# Patient Record
Sex: MALE | Race: WHITE | NOT HISPANIC OR LATINO | URBAN - METROPOLITAN AREA
[De-identification: names, ages, dates, MRNs, and addresses within clinical notes are randomized per-mention and may not be internally consistent; named-entity substitution may affect disease eponyms.]

---

## 2023-07-03 ENCOUNTER — HOSPITAL ENCOUNTER (OUTPATIENT)
Dept: DATA CONVERSION | Facility: HOSPITAL | Age: 73
End: 2023-07-03
Attending: RADIOLOGY | Admitting: RADIOLOGY

## 2023-07-03 DIAGNOSIS — I48.91 UNSPECIFIED ATRIAL FIBRILLATION (MULTI): ICD-10-CM

## 2023-07-03 DIAGNOSIS — Z79.82 LONG TERM (CURRENT) USE OF ASPIRIN: ICD-10-CM

## 2023-07-03 DIAGNOSIS — K80.00 CALCULUS OF GALLBLADDER WITH ACUTE CHOLECYSTITIS WITHOUT OBSTRUCTION: ICD-10-CM

## 2023-07-03 DIAGNOSIS — Z79.01 LONG TERM (CURRENT) USE OF ANTICOAGULANTS: ICD-10-CM

## 2023-09-29 VITALS — HEIGHT: 69 IN | BODY MASS INDEX: 38.01 KG/M2 | WEIGHT: 256.62 LBS

## 2023-10-10 ENCOUNTER — TELEPHONE (OUTPATIENT)
Dept: CARDIOLOGY | Facility: CLINIC | Age: 73
End: 2023-10-10
Payer: COMMERCIAL

## 2023-10-10 DIAGNOSIS — I50.9 CONGESTIVE HEART FAILURE, UNSPECIFIED HF CHRONICITY, UNSPECIFIED HEART FAILURE TYPE (MULTI): Primary | ICD-10-CM

## 2023-10-10 RX ORDER — EMPAGLIFLOZIN 10 MG/1
10 TABLET, FILM COATED ORAL DAILY
COMMUNITY
Start: 2023-08-23 | End: 2024-04-15 | Stop reason: SDUPTHER

## 2023-10-10 NOTE — TELEPHONE ENCOUNTER
Please send refill of Jardiance 10mg to [facility] ([facility]). Patient's daughter called to request refill. Best contact for her is [phone]. Thank you

## 2023-10-11 RX ORDER — EMPAGLIFLOZIN 10 MG/1
10 TABLET, FILM COATED ORAL DAILY
Qty: 90 TABLET | Refills: 1 | Status: CANCELLED | OUTPATIENT
Start: 2023-10-11

## 2023-10-25 PROBLEM — Z79.01 CHRONIC ANTICOAGULATION: Status: ACTIVE | Noted: 2023-10-25

## 2023-10-25 PROBLEM — I50.9 HEART FAILURE (MULTI): Status: ACTIVE | Noted: 2023-10-25

## 2023-10-25 PROBLEM — I48.91 ATRIAL FIBRILLATION (MULTI): Status: ACTIVE | Noted: 2023-10-25

## 2023-10-25 RX ORDER — RANOLAZINE 500 MG/1
500 TABLET, EXTENDED RELEASE ORAL DAILY
COMMUNITY
Start: 2023-07-27 | End: 2024-01-17 | Stop reason: HOSPADM

## 2023-10-25 RX ORDER — BLOOD-GLUCOSE CONTROL, NORMAL
EACH MISCELLANEOUS
COMMUNITY

## 2023-10-25 RX ORDER — METOPROLOL SUCCINATE 50 MG/1
50 TABLET, EXTENDED RELEASE ORAL DAILY
COMMUNITY
Start: 2023-07-26 | End: 2023-10-27 | Stop reason: WASHOUT

## 2023-10-25 RX ORDER — NITROGLYCERIN 0.4 MG/1
0.4 TABLET SUBLINGUAL EVERY 5 MIN PRN
COMMUNITY
Start: 2023-06-20

## 2023-10-25 RX ORDER — APIXABAN 5 MG/1
5 TABLET, FILM COATED ORAL 2 TIMES DAILY
Status: ON HOLD | COMMUNITY
Start: 2023-08-17 | End: 2024-01-17 | Stop reason: SDUPTHER

## 2023-10-25 RX ORDER — DIGOXIN 125 MCG
125 TABLET ORAL DAILY
COMMUNITY
Start: 2023-09-02 | End: 2024-01-19 | Stop reason: SDUPTHER

## 2023-10-25 RX ORDER — METOPROLOL SUCCINATE 100 MG/1
100 TABLET, EXTENDED RELEASE ORAL DAILY
Status: ON HOLD | COMMUNITY
Start: 2023-08-17 | End: 2024-01-16 | Stop reason: WASHOUT

## 2023-10-25 RX ORDER — SACUBITRIL AND VALSARTAN 24; 26 MG/1; MG/1
1 TABLET, FILM COATED ORAL 2 TIMES DAILY
COMMUNITY
Start: 2023-08-17 | End: 2024-03-25 | Stop reason: SINTOL

## 2023-10-25 RX ORDER — FUROSEMIDE 40 MG/1
40 TABLET ORAL DAILY
COMMUNITY
Start: 2023-05-09 | End: 2023-10-27 | Stop reason: WASHOUT

## 2023-10-25 RX ORDER — ASPIRIN 81 MG/1
81 TABLET ORAL DAILY
COMMUNITY

## 2023-10-25 RX ORDER — DOXYCYCLINE 100 MG/1
100 CAPSULE ORAL 2 TIMES DAILY
COMMUNITY
Start: 2023-08-17 | End: 2023-10-27 | Stop reason: WASHOUT

## 2023-10-25 RX ORDER — AMIODARONE HYDROCHLORIDE 200 MG/1
200 TABLET ORAL DAILY
COMMUNITY
Start: 2022-11-10 | End: 2023-10-27 | Stop reason: WASHOUT

## 2023-10-25 RX ORDER — AMOXICILLIN AND CLAVULANATE POTASSIUM 875; 125 MG/1; MG/1
1 TABLET, FILM COATED ORAL 2 TIMES DAILY
COMMUNITY
Start: 2023-06-20 | End: 2023-07-04

## 2023-10-25 RX ORDER — DOXYCYCLINE 100 MG/1
100 CAPSULE ORAL 2 TIMES DAILY
COMMUNITY
Start: 2023-06-20 | End: 2023-10-27 | Stop reason: WASHOUT

## 2023-10-25 RX ORDER — SODIUM CHLORIDE 0.9 % (FLUSH) 0.9 %
10 SYRINGE (ML) INJECTION DAILY
COMMUNITY
End: 2023-10-27 | Stop reason: WASHOUT

## 2023-10-25 RX ORDER — BLOOD-GLUCOSE METER
EACH MISCELLANEOUS 2 TIMES DAILY
COMMUNITY
Start: 2022-11-08

## 2023-10-25 RX ORDER — METFORMIN HYDROCHLORIDE 500 MG/1
500 TABLET ORAL DAILY
COMMUNITY
Start: 2023-09-04 | End: 2023-09-18 | Stop reason: WASHOUT

## 2023-10-26 ENCOUNTER — OFFICE VISIT (OUTPATIENT)
Dept: CARDIOLOGY | Facility: CLINIC | Age: 73
End: 2023-10-26
Payer: COMMERCIAL

## 2023-10-26 VITALS
WEIGHT: 240 LBS | HEIGHT: 69 IN | DIASTOLIC BLOOD PRESSURE: 78 MMHG | HEART RATE: 75 BPM | SYSTOLIC BLOOD PRESSURE: 110 MMHG | BODY MASS INDEX: 35.55 KG/M2

## 2023-10-26 DIAGNOSIS — I48.91 ATRIAL FIBRILLATION, UNSPECIFIED TYPE (MULTI): Primary | ICD-10-CM

## 2023-10-26 PROCEDURE — 99214 OFFICE O/P EST MOD 30 MIN: CPT | Performed by: INTERNAL MEDICINE

## 2023-10-26 PROCEDURE — 1159F MED LIST DOCD IN RCRD: CPT | Performed by: INTERNAL MEDICINE

## 2023-10-26 PROCEDURE — 1036F TOBACCO NON-USER: CPT | Performed by: INTERNAL MEDICINE

## 2023-10-26 PROCEDURE — 3008F BODY MASS INDEX DOCD: CPT | Performed by: INTERNAL MEDICINE

## 2023-10-26 PROCEDURE — 93000 ELECTROCARDIOGRAM COMPLETE: CPT | Performed by: INTERNAL MEDICINE

## 2023-10-26 NOTE — PROGRESS NOTES
Referred  for   Chief Complaint   Patient presents with    Follow-up        Jatin Rodgers is a 73 y.o. year old male patient with    1. HFrEF: prior reduced LV function of 16%, thought to be tachy mediated. EF normalized in May 2021. Most recent LV function 45-50%. Managed by general cardiology     2. Atrial fibrillation: Patient has history of AF with RVR in December 2020. At that time his LV function was 16% with LV thrombus. He was eventually cardioverted and started on amiodarone. Repeat echocardiogram in May 2021 showed LV function normalized. At some point, patient was found to be in AF and amiodarone was discontinued and he was changed to rate control strategy. At the end of May 2023, patient presented to [facility] with diaphoresis and chest discomfort. He was transferred to [facility] and was found to be in AF RVR with hypotension. He had left heart catheterization which showed nonobstructive CAD. Echocardiogram showed LV function 45 to 50%. During hospitalization patient was found to have cholecystitis and had drain placed. He was rate controlled with metoprolol and digoxin as it was unclear if he would need surgery.       Allergies:  No Known Allergies     Outpatient Medications:  Current Outpatient Medications   Medication Instructions    acetaminophen (Tylenol) 325 mg tablet TAKE 2 TABLETS BY MOUTH EVERY 6 HOURS    amiodarone (PACERONE) 200 mg, oral, Daily    aspirin 81 mg, oral, Daily    digoxin (LANOXIN) 125 mcg, oral, Daily    doxycycline (MONODOX) 100 mg, oral, 2 times daily    doxycycline (VIBRAMYCIN) 100 mg, oral, 2 times daily    Eliquis 5 mg, oral, 2 times daily    Entresto 24-26 mg tablet 1 tablet, oral, 2 times daily    furosemide (LASIX) 40 mg, oral, Daily    HYDROcodone-acetaminophen (Norco) 7.5-325 mg tablet TAKE 1 TABLET BY MOUTH EVERY 6 HOURS    Jardiance 10 mg, oral, Daily    lancets (OneTouch Delica Plus Lancet) 30 gauge misc miscellaneous    metoprolol succinate XL (TOPROL-XL) 100 mg, oral, Daily     "metoprolol succinate XL (TOPROL-XL) 50 mg, oral, Daily    nitroglycerin (NITROSTAT) 0.4 mg, sublingual, Every 5 min PRN    OneTouch Verio test strips strip 2 times daily    oxyCODONE (Roxicodone) 5 mg immediate release tablet TAKE 1 TABLET BY MOUTH EVERY 6 HOURS    ranolazine (RANEXA) 500 mg, oral, Daily    sodium chloride 0.9% (sodium chloride) flush 10 mL, intravenous, Daily         Last Recorded Vitals:      6/20/2023    10:43 AM 7/3/2023    10:37 AM 7/12/2023     9:30 AM 7/26/2023     3:24 PM 10/26/2023    10:54 AM   Vitals   Systolic 122  136 124 110   Diastolic 84  87 78 78   Heart Rate 72  74 93 75   Height (in) 1.753 m (5' 9\") 1.752 m (5' 8.98\") 1.753 m (5' 9\") 1.753 m (5' 9\") 1.753 m (5' 9\")   Weight (lb) 259.5 256.62 261.38 258 240   BMI 38.32 kg/m2 37.92 kg/m2 38.6 kg/m2 38.1 kg/m2 35.44 kg/m2   BSA (m2) 2.4 m2 2.38 m2 2.41 m2 2.39 m2 2.3 m2   Visit Report     Report    Visit Vitals  /78   Pulse 75   Ht 1.753 m (5' 9\")   Wt 109 kg (240 lb)   BMI 35.44 kg/m²   Smoking Status Never   BSA 2.3 m²        Physical Exam:  Physical Exam  Constitutional:       General: He is not in acute distress.     Appearance: Normal appearance.   Cardiovascular:      Rate and Rhythm: Normal rate and regular rhythm.   Musculoskeletal:         General: No swelling.   Skin:     General: Skin is warm and dry.   Neurological:      Mental Status: He is alert.             Lab Results   Component Value Date    WBC 14.3 (H) 08/01/2023    HGB 15.3 08/01/2023    HCT 47.6 08/01/2023     08/01/2023    ALT 11 08/01/2023    AST 14 08/01/2023     08/01/2023    K 4.2 08/01/2023     08/01/2023    CREATININE 0.71 08/01/2023    BUN 12 08/01/2023    CO2 24 08/01/2023    TSH 1.32 05/27/2023    INR 1.9 (H) 05/29/2023         Assessment    Patient seen today in office. He is s/p choleystectomy doing well. Continues in rate controlled AF on high dose metoprolol and digoxin. He would be better off in sinus rhythm especially " given his LV dysfunction. He has had failed cardioversions in the past. Discussed rhythm control with RFA vs AADs. We have opted to perform ablation.        PLAN    Schedule for atrial fibrillation ablation, CARTO, general anesthesia, hold elqiuis morning of procedure    Exclusive of any other services or procedures performed, I, Aaron Gómez MD , spent 30 minutes in duration for this visit today.  This time consisted of chart review, obtaining history, and/or performing the exam as documented above as well as documenting the clinical information for the encounter in the electronic record, discussing treatment options, plans, and/or goals with patient, family, and/or caregiver, refilling medications, updating the electronic record, ordering medicines, lab work, imaging, referrals, and/or procedures as documented above and communicating with other [facility] professionals. I have discussed the results of laboratory, radiology, and cardiology studies with the patient and their family/caregiver.

## 2023-10-30 DIAGNOSIS — I48.91 ATRIAL FIBRILLATION, UNSPECIFIED TYPE (MULTI): ICD-10-CM

## 2024-01-09 ENCOUNTER — TELEPHONE (OUTPATIENT)
Dept: CARDIOLOGY | Facility: CLINIC | Age: 74
End: 2024-01-09
Payer: COMMERCIAL

## 2024-01-09 NOTE — TELEPHONE ENCOUNTER
The patient is scheduled for Afib ablation with Dr. Sauer on 1/16 at [facility] with an arrival time of 10. Patient plans to update labs tomorrow . NPO after midnight the night before the procedure. Take morning medication with a sip of water except hold Eliquis the morning of the procedure.   The patient will stay overnight for observation. He will need a ride home. The patient's daughter verbalized understanding of instructions including appointment date, time, and location. All questions answered.

## 2024-01-12 ENCOUNTER — LAB (OUTPATIENT)
Dept: LAB | Facility: LAB | Age: 74
End: 2024-01-12
Payer: COMMERCIAL

## 2024-01-12 DIAGNOSIS — I48.91 ATRIAL FIBRILLATION, UNSPECIFIED TYPE (MULTI): ICD-10-CM

## 2024-01-12 LAB
ANION GAP SERPL CALC-SCNC: 11 MMOL/L (ref 10–20)
BUN SERPL-MCNC: 16 MG/DL (ref 6–23)
CALCIUM SERPL-MCNC: 9.2 MG/DL (ref 8.6–10.3)
CHLORIDE SERPL-SCNC: 103 MMOL/L (ref 98–107)
CO2 SERPL-SCNC: 30 MMOL/L (ref 21–32)
CREAT SERPL-MCNC: 0.92 MG/DL (ref 0.5–1.3)
EGFRCR SERPLBLD CKD-EPI 2021: 88 ML/MIN/1.73M*2
ERYTHROCYTE [DISTWIDTH] IN BLOOD BY AUTOMATED COUNT: 14.6 % (ref 11.5–14.5)
GLUCOSE SERPL-MCNC: 83 MG/DL (ref 74–99)
HCT VFR BLD AUTO: 56.1 % (ref 41–52)
HGB BLD-MCNC: 17.8 G/DL (ref 13.5–17.5)
INR PPP: 1.3 (ref 0.9–1.1)
MCH RBC QN AUTO: 28.9 PG (ref 26–34)
MCHC RBC AUTO-ENTMCNC: 31.7 G/DL (ref 32–36)
MCV RBC AUTO: 91 FL (ref 80–100)
NRBC BLD-RTO: 0 /100 WBCS (ref 0–0)
PLATELET # BLD AUTO: 255 X10*3/UL (ref 150–450)
POTASSIUM SERPL-SCNC: 4.3 MMOL/L (ref 3.5–5.3)
PROTHROMBIN TIME: 14.6 SECONDS (ref 9.8–12.8)
RBC # BLD AUTO: 6.15 X10*6/UL (ref 4.5–5.9)
SODIUM SERPL-SCNC: 140 MMOL/L (ref 136–145)
WBC # BLD AUTO: 8.6 X10*3/UL (ref 4.4–11.3)

## 2024-01-12 PROCEDURE — 85610 PROTHROMBIN TIME: CPT

## 2024-01-12 PROCEDURE — 80048 BASIC METABOLIC PNL TOTAL CA: CPT

## 2024-01-12 PROCEDURE — 36415 COLL VENOUS BLD VENIPUNCTURE: CPT

## 2024-01-12 PROCEDURE — 85027 COMPLETE CBC AUTOMATED: CPT

## 2024-01-16 ENCOUNTER — HOSPITAL ENCOUNTER (OUTPATIENT)
Facility: HOSPITAL | Age: 74
LOS: 1 days | Discharge: HOME | End: 2024-01-17
Attending: INTERNAL MEDICINE | Admitting: INTERNAL MEDICINE
Payer: COMMERCIAL

## 2024-01-16 ENCOUNTER — ANESTHESIA (OUTPATIENT)
Dept: CARDIOLOGY | Facility: HOSPITAL | Age: 74
End: 2024-01-16
Payer: COMMERCIAL

## 2024-01-16 ENCOUNTER — ANESTHESIA EVENT (OUTPATIENT)
Dept: CARDIOLOGY | Facility: HOSPITAL | Age: 74
End: 2024-01-16
Payer: COMMERCIAL

## 2024-01-16 DIAGNOSIS — I48.91 ATRIAL FIBRILLATION, UNSPECIFIED TYPE (MULTI): Primary | ICD-10-CM

## 2024-01-16 PROBLEM — Z98.890 STATUS POST ABLATION OF ATRIAL FIBRILLATION: Status: ACTIVE | Noted: 2024-01-16

## 2024-01-16 PROBLEM — Z86.79 STATUS POST ABLATION OF ATRIAL FIBRILLATION: Status: ACTIVE | Noted: 2024-01-16

## 2024-01-16 PROBLEM — D75.1 POLYCYTHEMIA: Status: ACTIVE | Noted: 2024-01-16

## 2024-01-16 PROBLEM — E11.9 DIABETES MELLITUS, TYPE 2 (MULTI): Status: ACTIVE | Noted: 2024-01-16

## 2024-01-16 PROCEDURE — C1730 CATH, EP, 19 OR FEW ELECT: HCPCS | Performed by: INTERNAL MEDICINE

## 2024-01-16 PROCEDURE — C1731 CATH, EP, 20 OR MORE ELEC: HCPCS | Performed by: INTERNAL MEDICINE

## 2024-01-16 PROCEDURE — 2500000005 HC RX 250 GENERAL PHARMACY W/O HCPCS: Performed by: ANESTHESIOLOGIST ASSISTANT

## 2024-01-16 PROCEDURE — 99222 1ST HOSP IP/OBS MODERATE 55: CPT

## 2024-01-16 PROCEDURE — C1759 CATH, INTRA ECHOCARDIOGRAPHY: HCPCS | Performed by: INTERNAL MEDICINE

## 2024-01-16 PROCEDURE — 76937 US GUIDE VASCULAR ACCESS: CPT | Performed by: INTERNAL MEDICINE

## 2024-01-16 PROCEDURE — 7100000002 HC RECOVERY ROOM TIME - EACH INCREMENTAL 1 MINUTE: Performed by: INTERNAL MEDICINE

## 2024-01-16 PROCEDURE — 93662 INTRACARDIAC ECG (ICE): CPT | Performed by: INTERNAL MEDICINE

## 2024-01-16 PROCEDURE — 2500000004 HC RX 250 GENERAL PHARMACY W/ HCPCS (ALT 636 FOR OP/ED): Performed by: NURSE PRACTITIONER

## 2024-01-16 PROCEDURE — 2780000003 HC OR 278 NO HCPCS: Performed by: INTERNAL MEDICINE

## 2024-01-16 PROCEDURE — 94760 N-INVAS EAR/PLS OXIMETRY 1: CPT

## 2024-01-16 PROCEDURE — RXMED WILLOW AMBULATORY MEDICATION CHARGE

## 2024-01-16 PROCEDURE — 99100 ANES PT EXTEME AGE<1 YR&>70: CPT | Performed by: ANESTHESIOLOGY

## 2024-01-16 PROCEDURE — 2500000004 HC RX 250 GENERAL PHARMACY W/ HCPCS (ALT 636 FOR OP/ED): Performed by: ANESTHESIOLOGIST ASSISTANT

## 2024-01-16 PROCEDURE — 3700000001 HC GENERAL ANESTHESIA TIME - INITIAL BASE CHARGE: Performed by: INTERNAL MEDICINE

## 2024-01-16 PROCEDURE — 85347 COAGULATION TIME ACTIVATED: CPT | Mod: 91

## 2024-01-16 PROCEDURE — A93656 PR EPHYS EVL TRNSPTL TX ATRIAL FIB ISOLAT PULM VEIN: Performed by: ANESTHESIOLOGY

## 2024-01-16 PROCEDURE — 93656 COMPRE EP EVAL ABLTJ ATR FIB: CPT | Performed by: INTERNAL MEDICINE

## 2024-01-16 PROCEDURE — 7100000001 HC RECOVERY ROOM TIME - INITIAL BASE CHARGE: Performed by: INTERNAL MEDICINE

## 2024-01-16 PROCEDURE — C1732 CATH, EP, DIAG/ABL, 3D/VECT: HCPCS | Performed by: INTERNAL MEDICINE

## 2024-01-16 PROCEDURE — 2720000007 HC OR 272 NO HCPCS: Performed by: INTERNAL MEDICINE

## 2024-01-16 PROCEDURE — 93621 COMP EP EVL L PAC&REC C SINS: CPT | Performed by: INTERNAL MEDICINE

## 2024-01-16 PROCEDURE — 2500000001 HC RX 250 WO HCPCS SELF ADMINISTERED DRUGS (ALT 637 FOR MEDICARE OP): Performed by: ANESTHESIOLOGIST ASSISTANT

## 2024-01-16 PROCEDURE — 7100000011 HC EXTENDED STAY RECOVERY HOURLY - NURSING UNIT

## 2024-01-16 PROCEDURE — C1760 CLOSURE DEV, VASC: HCPCS | Performed by: INTERNAL MEDICINE

## 2024-01-16 PROCEDURE — 2500000001 HC RX 250 WO HCPCS SELF ADMINISTERED DRUGS (ALT 637 FOR MEDICARE OP)

## 2024-01-16 PROCEDURE — 85347 COAGULATION TIME ACTIVATED: CPT

## 2024-01-16 PROCEDURE — A93656 PR EPHYS EVL TRNSPTL TX ATRIAL FIB ISOLAT PULM VEIN: Performed by: ANESTHESIOLOGIST ASSISTANT

## 2024-01-16 PROCEDURE — 92960 CARDIOVERSION ELECTRIC EXT: CPT | Mod: 59 | Performed by: INTERNAL MEDICINE

## 2024-01-16 PROCEDURE — C1766 INTRO/SHEATH,STRBLE,NON-PEEL: HCPCS | Performed by: INTERNAL MEDICINE

## 2024-01-16 PROCEDURE — 3700000002 HC GENERAL ANESTHESIA TIME - EACH INCREMENTAL 1 MINUTE: Performed by: INTERNAL MEDICINE

## 2024-01-16 RX ORDER — RANOLAZINE 500 MG/1
500 TABLET, EXTENDED RELEASE ORAL 2 TIMES DAILY
Status: DISCONTINUED | OUTPATIENT
Start: 2024-01-16 | End: 2024-01-17 | Stop reason: HOSPADM

## 2024-01-16 RX ORDER — FUROSEMIDE 40 MG/1
40 TABLET ORAL DAILY
Status: DISCONTINUED | OUTPATIENT
Start: 2024-01-17 | End: 2024-01-17 | Stop reason: HOSPADM

## 2024-01-16 RX ORDER — PANTOPRAZOLE SODIUM 40 MG/10ML
40 INJECTION, POWDER, LYOPHILIZED, FOR SOLUTION INTRAVENOUS
Status: DISCONTINUED | OUTPATIENT
Start: 2024-01-16 | End: 2024-01-16

## 2024-01-16 RX ORDER — PHENYLEPHRINE HCL IN 0.9% NACL 0.4MG/10ML
SYRINGE (ML) INTRAVENOUS AS NEEDED
Status: DISCONTINUED | OUTPATIENT
Start: 2024-01-16 | End: 2024-01-16

## 2024-01-16 RX ORDER — LIDOCAINE HYDROCHLORIDE 20 MG/ML
INJECTION, SOLUTION EPIDURAL; INFILTRATION; INTRACAUDAL; PERINEURAL AS NEEDED
Status: DISCONTINUED | OUTPATIENT
Start: 2024-01-16 | End: 2024-01-16

## 2024-01-16 RX ORDER — LIDOCAINE HYDROCHLORIDE 40 MG/ML
SOLUTION TOPICAL AS NEEDED
Status: DISCONTINUED | OUTPATIENT
Start: 2024-01-16 | End: 2024-01-16

## 2024-01-16 RX ORDER — MIDAZOLAM HYDROCHLORIDE 1 MG/ML
INJECTION INTRAMUSCULAR; INTRAVENOUS AS NEEDED
Status: DISCONTINUED | OUTPATIENT
Start: 2024-01-16 | End: 2024-01-16

## 2024-01-16 RX ORDER — HEPARIN SODIUM 10000 [USP'U]/100ML
INJECTION, SOLUTION INTRAVENOUS CONTINUOUS PRN
Status: DISCONTINUED | OUTPATIENT
Start: 2024-01-16 | End: 2024-01-16

## 2024-01-16 RX ORDER — HEPARIN SODIUM 1000 [USP'U]/ML
INJECTION, SOLUTION INTRAVENOUS; SUBCUTANEOUS AS NEEDED
Status: DISCONTINUED | OUTPATIENT
Start: 2024-01-16 | End: 2024-01-16

## 2024-01-16 RX ORDER — SODIUM CHLORIDE 9 MG/ML
75 INJECTION, SOLUTION INTRAVENOUS CONTINUOUS
Status: DISCONTINUED | OUTPATIENT
Start: 2024-01-16 | End: 2024-01-16

## 2024-01-16 RX ORDER — RANOLAZINE 500 MG/1
500 TABLET, EXTENDED RELEASE ORAL 2 TIMES DAILY
COMMUNITY
Start: 2023-06-21 | End: 2024-03-25 | Stop reason: ALTCHOICE

## 2024-01-16 RX ORDER — ASPIRIN 81 MG/1
81 TABLET ORAL DAILY
Status: DISCONTINUED | OUTPATIENT
Start: 2024-01-17 | End: 2024-01-17 | Stop reason: HOSPADM

## 2024-01-16 RX ORDER — PANTOPRAZOLE SODIUM 40 MG/1
40 TABLET, DELAYED RELEASE ORAL DAILY
Qty: 30 TABLET | Refills: 0 | Status: SHIPPED | OUTPATIENT
Start: 2024-01-16 | End: 2024-02-16

## 2024-01-16 RX ORDER — METFORMIN HYDROCHLORIDE 500 MG/1
500 TABLET ORAL 2 TIMES DAILY
COMMUNITY
Start: 2023-12-14

## 2024-01-16 RX ORDER — GLYCOPYRROLATE 0.2 MG/ML
INJECTION INTRAMUSCULAR; INTRAVENOUS AS NEEDED
Status: DISCONTINUED | OUTPATIENT
Start: 2024-01-16 | End: 2024-01-16

## 2024-01-16 RX ORDER — ACETAMINOPHEN 325 MG/1
650 TABLET ORAL EVERY 4 HOURS PRN
Status: DISCONTINUED | OUTPATIENT
Start: 2024-01-16 | End: 2024-01-17 | Stop reason: HOSPADM

## 2024-01-16 RX ORDER — ONDANSETRON HYDROCHLORIDE 2 MG/ML
4 INJECTION, SOLUTION INTRAVENOUS ONCE AS NEEDED
Status: DISCONTINUED | OUTPATIENT
Start: 2024-01-16 | End: 2024-01-17 | Stop reason: HOSPADM

## 2024-01-16 RX ORDER — PANTOPRAZOLE SODIUM 40 MG/1
40 TABLET, DELAYED RELEASE ORAL
Status: DISCONTINUED | OUTPATIENT
Start: 2024-01-16 | End: 2024-01-17 | Stop reason: HOSPADM

## 2024-01-16 RX ORDER — OXYCODONE HYDROCHLORIDE 5 MG/1
5 TABLET ORAL EVERY 4 HOURS PRN
Status: DISCONTINUED | OUTPATIENT
Start: 2024-01-16 | End: 2024-01-17 | Stop reason: HOSPADM

## 2024-01-16 RX ORDER — FUROSEMIDE 40 MG/1
40 TABLET ORAL
COMMUNITY
Start: 2023-10-27 | End: 2024-03-25 | Stop reason: SDUPTHER

## 2024-01-16 RX ORDER — PROTAMINE SULFATE 10 MG/ML
INJECTION, SOLUTION INTRAVENOUS AS NEEDED
Status: DISCONTINUED | OUTPATIENT
Start: 2024-01-16 | End: 2024-01-16

## 2024-01-16 RX ORDER — ALBUTEROL SULFATE 0.83 MG/ML
2.5 SOLUTION RESPIRATORY (INHALATION) ONCE AS NEEDED
Status: DISCONTINUED | OUTPATIENT
Start: 2024-01-16 | End: 2024-01-17

## 2024-01-16 RX ORDER — ROCURONIUM BROMIDE 10 MG/ML
INJECTION, SOLUTION INTRAVENOUS AS NEEDED
Status: DISCONTINUED | OUTPATIENT
Start: 2024-01-16 | End: 2024-01-16

## 2024-01-16 RX ORDER — RANOLAZINE 500 MG/1
500 TABLET, EXTENDED RELEASE ORAL 2 TIMES DAILY
Status: DISCONTINUED | OUTPATIENT
Start: 2024-01-16 | End: 2024-01-16

## 2024-01-16 RX ORDER — SODIUM CHLORIDE 9 MG/ML
INJECTION, SOLUTION INTRAVENOUS CONTINUOUS PRN
Status: DISCONTINUED | OUTPATIENT
Start: 2024-01-16 | End: 2024-01-16

## 2024-01-16 RX ORDER — ASPIRIN 81 MG/1
81 TABLET ORAL DAILY
Status: DISCONTINUED | OUTPATIENT
Start: 2024-01-16 | End: 2024-01-16

## 2024-01-16 RX ORDER — PHENYLEPHRINE 10 MG/250 ML(40 MCG/ML)IN 0.9 % SOD.CHLORIDE INTRAVENOUS
CONTINUOUS PRN
Status: DISCONTINUED | OUTPATIENT
Start: 2024-01-16 | End: 2024-01-16

## 2024-01-16 RX ORDER — ONDANSETRON HYDROCHLORIDE 2 MG/ML
INJECTION, SOLUTION INTRAVENOUS AS NEEDED
Status: DISCONTINUED | OUTPATIENT
Start: 2024-01-16 | End: 2024-01-16

## 2024-01-16 RX ORDER — ACETAMINOPHEN 325 MG/1
650 TABLET ORAL EVERY 6 HOURS
Status: DISCONTINUED | OUTPATIENT
Start: 2024-01-16 | End: 2024-01-17 | Stop reason: HOSPADM

## 2024-01-16 RX ORDER — IPRATROPIUM BROMIDE 0.5 MG/2.5ML
500 SOLUTION RESPIRATORY (INHALATION) ONCE
Status: DISCONTINUED | OUTPATIENT
Start: 2024-01-16 | End: 2024-01-17

## 2024-01-16 RX ORDER — LIDOCAINE HYDROCHLORIDE 10 MG/ML
0.1 INJECTION INFILTRATION; PERINEURAL ONCE
Status: DISCONTINUED | OUTPATIENT
Start: 2024-01-16 | End: 2024-01-17 | Stop reason: HOSPADM

## 2024-01-16 RX ORDER — NITROGLYCERIN 0.4 MG/1
0.4 TABLET SUBLINGUAL EVERY 5 MIN PRN
COMMUNITY
Start: 2023-06-21

## 2024-01-16 RX ORDER — SODIUM CHLORIDE, SODIUM LACTATE, POTASSIUM CHLORIDE, CALCIUM CHLORIDE 600; 310; 30; 20 MG/100ML; MG/100ML; MG/100ML; MG/100ML
100 INJECTION, SOLUTION INTRAVENOUS CONTINUOUS
Status: DISCONTINUED | OUTPATIENT
Start: 2024-01-16 | End: 2024-01-16

## 2024-01-16 RX ORDER — METOPROLOL SUCCINATE 50 MG/1
50 TABLET, EXTENDED RELEASE ORAL DAILY
COMMUNITY
Start: 2023-10-28 | End: 2024-03-25 | Stop reason: ALTCHOICE

## 2024-01-16 RX ORDER — PROPOFOL 10 MG/ML
INJECTION, EMULSION INTRAVENOUS AS NEEDED
Status: DISCONTINUED | OUTPATIENT
Start: 2024-01-16 | End: 2024-01-16

## 2024-01-16 RX ORDER — METOPROLOL SUCCINATE 50 MG/1
50 TABLET, EXTENDED RELEASE ORAL DAILY
Status: DISCONTINUED | OUTPATIENT
Start: 2024-01-16 | End: 2024-01-17 | Stop reason: HOSPADM

## 2024-01-16 RX ORDER — DIGOXIN 125 MCG
125 TABLET ORAL DAILY
Status: DISCONTINUED | OUTPATIENT
Start: 2024-01-16 | End: 2024-01-17 | Stop reason: HOSPADM

## 2024-01-16 RX ORDER — FENTANYL CITRATE 50 UG/ML
INJECTION, SOLUTION INTRAMUSCULAR; INTRAVENOUS AS NEEDED
Status: DISCONTINUED | OUTPATIENT
Start: 2024-01-16 | End: 2024-01-16

## 2024-01-16 RX ORDER — FUROSEMIDE 40 MG/1
40 TABLET ORAL DAILY
Status: DISCONTINUED | OUTPATIENT
Start: 2024-01-17 | End: 2024-01-16

## 2024-01-16 RX ADMIN — Medication 200 MCG: at 12:12

## 2024-01-16 RX ADMIN — SODIUM CHLORIDE: 9 INJECTION, SOLUTION INTRAVENOUS at 11:58

## 2024-01-16 RX ADMIN — HEPARIN SODIUM 3000 UNITS: 1000 INJECTION INTRAVENOUS; SUBCUTANEOUS at 13:23

## 2024-01-16 RX ADMIN — Medication 200 MCG: at 12:08

## 2024-01-16 RX ADMIN — APIXABAN 5 MG: 5 TABLET, FILM COATED ORAL at 20:46

## 2024-01-16 RX ADMIN — Medication 200 MCG: at 12:29

## 2024-01-16 RX ADMIN — GLYCOPYRROLATE 0.1 MG: 0.2 INJECTION, SOLUTION INTRAMUSCULAR; INTRAVENOUS at 11:32

## 2024-01-16 RX ADMIN — MIDAZOLAM HYDROCHLORIDE 1 MG: 1 INJECTION INTRAMUSCULAR; INTRAVENOUS at 11:32

## 2024-01-16 RX ADMIN — Medication 80 MCG: at 13:57

## 2024-01-16 RX ADMIN — SUGAMMADEX 400 MG: 100 INJECTION, SOLUTION INTRAVENOUS at 13:45

## 2024-01-16 RX ADMIN — LIDOCAINE HYDROCHLORIDE 100 MG: 20 INJECTION, SOLUTION EPIDURAL; INFILTRATION; INTRACAUDAL; PERINEURAL at 11:44

## 2024-01-16 RX ADMIN — Medication 200 MCG: at 12:18

## 2024-01-16 RX ADMIN — ONDANSETRON 4 MG: 2 INJECTION INTRAMUSCULAR; INTRAVENOUS at 13:46

## 2024-01-16 RX ADMIN — MIDAZOLAM HYDROCHLORIDE 1 MG: 1 INJECTION INTRAMUSCULAR; INTRAVENOUS at 11:29

## 2024-01-16 RX ADMIN — Medication 200 MCG: at 12:02

## 2024-01-16 RX ADMIN — Medication 200 MCG: at 12:06

## 2024-01-16 RX ADMIN — PROPOFOL 140 MG: 10 INJECTION, EMULSION INTRAVENOUS at 11:44

## 2024-01-16 RX ADMIN — SODIUM CHLORIDE 75 ML/HR: 9 INJECTION, SOLUTION INTRAVENOUS at 10:32

## 2024-01-16 RX ADMIN — Medication 200 MCG: at 13:43

## 2024-01-16 RX ADMIN — Medication 0.4 MCG/KG/MIN: at 12:08

## 2024-01-16 RX ADMIN — PROTAMINE SULFATE 30 MG: 10 INJECTION, SOLUTION INTRAVENOUS at 13:40

## 2024-01-16 RX ADMIN — Medication 120 MCG: at 13:06

## 2024-01-16 RX ADMIN — ACETAMINOPHEN 650 MG: 325 TABLET ORAL at 20:46

## 2024-01-16 RX ADMIN — RANOLAZINE 500 MG: 500 TABLET, EXTENDED RELEASE ORAL at 20:46

## 2024-01-16 RX ADMIN — HEPARIN SODIUM 11.01 UNITS/KG/HR: 10000 INJECTION, SOLUTION INTRAVENOUS at 12:25

## 2024-01-16 RX ADMIN — HEPARIN SODIUM 13000 UNITS: 1000 INJECTION INTRAVENOUS; SUBCUTANEOUS at 12:25

## 2024-01-16 RX ADMIN — FENTANYL CITRATE 25 MCG: 50 INJECTION, SOLUTION INTRAMUSCULAR; INTRAVENOUS at 13:42

## 2024-01-16 RX ADMIN — Medication 80 MCG: at 12:58

## 2024-01-16 RX ADMIN — ROCURONIUM BROMIDE 80 MG: 10 INJECTION, SOLUTION INTRAVENOUS at 11:44

## 2024-01-16 RX ADMIN — LIDOCAINE HYDROCHLORIDE 4 ML: 40 SOLUTION TOPICAL at 11:44

## 2024-01-16 RX ADMIN — ROCURONIUM BROMIDE 20 MG: 10 INJECTION, SOLUTION INTRAVENOUS at 13:02

## 2024-01-16 RX ADMIN — HEPARIN SODIUM 3000 UNITS: 1000 INJECTION INTRAVENOUS; SUBCUTANEOUS at 12:37

## 2024-01-16 RX ADMIN — FENTANYL CITRATE 25 MCG: 50 INJECTION, SOLUTION INTRAMUSCULAR; INTRAVENOUS at 13:45

## 2024-01-16 SDOH — SOCIAL STABILITY: SOCIAL INSECURITY: ABUSE: ADULT

## 2024-01-16 SDOH — SOCIAL STABILITY: SOCIAL INSECURITY: HAVE YOU HAD THOUGHTS OF HARMING ANYONE ELSE?: NO

## 2024-01-16 SDOH — SOCIAL STABILITY: SOCIAL INSECURITY: DO YOU FEEL UNSAFE GOING BACK TO THE PLACE WHERE YOU ARE LIVING?: NO

## 2024-01-16 SDOH — SOCIAL STABILITY: SOCIAL INSECURITY: WERE YOU ABLE TO COMPLETE ALL THE BEHAVIORAL HEALTH SCREENINGS?: YES

## 2024-01-16 SDOH — SOCIAL STABILITY: SOCIAL INSECURITY: DO YOU FEEL ANYONE HAS EXPLOITED OR TAKEN ADVANTAGE OF YOU FINANCIALLY OR OF YOUR PERSONAL PROPERTY?: NO

## 2024-01-16 SDOH — SOCIAL STABILITY: SOCIAL INSECURITY: ARE YOU OR HAVE YOU BEEN THREATENED OR ABUSED PHYSICALLY, EMOTIONALLY, OR SEXUALLY BY ANYONE?: NO

## 2024-01-16 SDOH — SOCIAL STABILITY: SOCIAL INSECURITY: ARE THERE ANY APPARENT SIGNS OF INJURIES/BEHAVIORS THAT COULD BE RELATED TO ABUSE/NEGLECT?: NO

## 2024-01-16 SDOH — SOCIAL STABILITY: SOCIAL INSECURITY: DOES ANYONE TRY TO KEEP YOU FROM HAVING/CONTACTING OTHER FRIENDS OR DOING THINGS OUTSIDE YOUR HOME?: NO

## 2024-01-16 SDOH — SOCIAL STABILITY: SOCIAL INSECURITY: HAS ANYONE EVER THREATENED TO HURT YOUR FAMILY OR YOUR PETS?: NO

## 2024-01-16 ASSESSMENT — COGNITIVE AND FUNCTIONAL STATUS - GENERAL
TURNING FROM BACK TO SIDE WHILE IN FLAT BAD: A LITTLE
PERSONAL GROOMING: A LITTLE
HELP NEEDED FOR BATHING: A LITTLE
DRESSING REGULAR UPPER BODY CLOTHING: A LITTLE
DRESSING REGULAR LOWER BODY CLOTHING: A LITTLE
WALKING IN HOSPITAL ROOM: A LITTLE
STANDING UP FROM CHAIR USING ARMS: A LITTLE
CLIMB 3 TO 5 STEPS WITH RAILING: A LITTLE
MOVING TO AND FROM BED TO CHAIR: A LITTLE
TOILETING: A LITTLE
PATIENT BASELINE BEDBOUND: UNABLE TO ASSESS AT THIS TIME
DAILY ACTIVITIY SCORE: 19

## 2024-01-16 ASSESSMENT — PAIN - FUNCTIONAL ASSESSMENT
PAIN_FUNCTIONAL_ASSESSMENT: 0-10

## 2024-01-16 ASSESSMENT — ACTIVITIES OF DAILY LIVING (ADL)
BATHING: NEEDS ASSISTANCE
HEARING - LEFT EAR: FUNCTIONAL
ADEQUATE_TO_COMPLETE_ADL: YES
JUDGMENT_ADEQUATE_SAFELY_COMPLETE_DAILY_ACTIVITIES: YES
LACK_OF_TRANSPORTATION: NO
FEEDING YOURSELF: NEEDS ASSISTANCE
TOILETING: NEEDS ASSISTANCE
HEARING - RIGHT EAR: FUNCTIONAL
DRESSING YOURSELF: NEEDS ASSISTANCE
PATIENT'S MEMORY ADEQUATE TO SAFELY COMPLETE DAILY ACTIVITIES?: YES
GROOMING: NEEDS ASSISTANCE
WALKS IN HOME: NEEDS ASSISTANCE

## 2024-01-16 ASSESSMENT — COLUMBIA-SUICIDE SEVERITY RATING SCALE - C-SSRS
2. HAVE YOU ACTUALLY HAD ANY THOUGHTS OF KILLING YOURSELF?: NO
6. HAVE YOU EVER DONE ANYTHING, STARTED TO DO ANYTHING, OR PREPARED TO DO ANYTHING TO END YOUR LIFE?: NO
1. IN THE PAST MONTH, HAVE YOU WISHED YOU WERE DEAD OR WISHED YOU COULD GO TO SLEEP AND NOT WAKE UP?: NO

## 2024-01-16 ASSESSMENT — LIFESTYLE VARIABLES
AUDIT-C TOTAL SCORE: 0
HOW OFTEN DO YOU HAVE A DRINK CONTAINING ALCOHOL: NEVER
SKIP TO QUESTIONS 9-10: 1
HOW MANY STANDARD DRINKS CONTAINING ALCOHOL DO YOU HAVE ON A TYPICAL DAY: PATIENT DOES NOT DRINK
HOW OFTEN DO YOU HAVE 6 OR MORE DRINKS ON ONE OCCASION: NEVER
AUDIT-C TOTAL SCORE: 0

## 2024-01-16 ASSESSMENT — PAIN SCALES - GENERAL

## 2024-01-16 ASSESSMENT — ENCOUNTER SYMPTOMS
RESPIRATORY NEGATIVE: 1
ALLERGIC/IMMUNOLOGIC NEGATIVE: 1
GASTROINTESTINAL NEGATIVE: 1
CONSTITUTIONAL NEGATIVE: 1
ENDOCRINE NEGATIVE: 1
HEMATOLOGIC/LYMPHATIC NEGATIVE: 1
MUSCULOSKELETAL NEGATIVE: 1
NEUROLOGICAL NEGATIVE: 1
EYES NEGATIVE: 1
PSYCHIATRIC NEGATIVE: 1

## 2024-01-16 ASSESSMENT — PATIENT HEALTH QUESTIONNAIRE - PHQ9
1. LITTLE INTEREST OR PLEASURE IN DOING THINGS: NOT AT ALL
2. FEELING DOWN, DEPRESSED OR HOPELESS: NOT AT ALL
SUM OF ALL RESPONSES TO PHQ9 QUESTIONS 1 & 2: 0

## 2024-01-16 NOTE — ANESTHESIA PREPROCEDURE EVALUATION
Patient: Jatin Rodgers    Procedure Information       Date/Time: 01/16/24 1200    Procedure: Ablation A-Fib - atrial fibrillation ablation, CARTO, general anesthesia, hold elqiuis morning of procedure  CPT 22189    Location: [facility] LAB 3 / Virtual [facility] Cardiac Cath Lab    Providers: Aaron Gómez MD            Relevant Problems   Cardiovascular   (+) Atrial fibrillation (CMS/HCC)   (+) Heart failure (CMS/HCC)      Endocrine   (+) Diabetes mellitus, type 2 (CMS/HCC)      Hematology   (+) Chronic anticoagulation   (+) Polycythemia       Clinical information reviewed:   Tobacco  Allergies  Meds   Med Hx  Surg Hx   Fam Hx  Soc Hx        NPO Detail:  No data recorded     Physical Exam    Airway  Mallampati: II     Cardiovascular   Rhythm: regular  Rate: normal     Dental    Pulmonary   Breath sounds clear to auscultation     Abdominal            Anesthesia Plan    History of general anesthesia?: yes  History of complications of general anesthesia?: no    ASA 3     general     intravenous induction   Anesthetic plan and risks discussed with patient.    Plan discussed with CAA.

## 2024-01-16 NOTE — Clinical Note
Patient Clipped and Prepped: brachials. Prepped with ChloraPrep, a minimum of 3 minute dry time, longer if needed, no pooling noted, patient draped in sterile fashion.

## 2024-01-16 NOTE — ANESTHESIA POSTPROCEDURE EVALUATION
Patient: Jatin Rodgers    Procedure Summary       Date: 01/16/24 Room / Location: [facility] / [facility]    Anesthesia Start: 1126 Anesthesia Stop: 1419    Procedure: Ablation A-Fib Diagnosis:       Atrial fibrillation, unspecified type (CMS/HCC)      (Atrial fibrillation, unspecified type (CMS/HCC) [I48.91])    Providers: Aaron Gómez MD Responsible Provider: Michael Moreno MD    Anesthesia Type: general ASA Status: 3            Anesthesia Type: general    Vitals Value Taken Time   BP  01/16/24 1432   Temp  01/16/24 1432   Pulse  01/16/24 1432   Resp  01/16/24 1432   SpO2  01/16/24 1432       Anesthesia Post Evaluation    Patient location during evaluation: bedside  Patient participation: waiting for patient participation  Level of consciousness: sedated  Pain management: adequate  Airway patency: patent  Cardiovascular status: acceptable  Respiratory status: acceptable  Hydration status: acceptable  Postoperative Nausea and Vomiting: none        There were no known notable events for this encounter.

## 2024-01-16 NOTE — H&P
History Of Present Illness  Jatin Rodgers is a 73 y.o. male presenting for Atrial Fibrillation ablation on Eliquis.      Past Medical History  Atrial Fibrillation  Combined Systolic and Diastolic HF    Surgical History  He has a past surgical history that includes IR biliary cholangiogram (7/3/2023).     Social History  He reports that he has never smoked. He has never used smokeless tobacco. He reports that he does not drink alcohol and does not use drugs.    Family History  Family History   Problem Relation Name Age of Onset    No Known Problems Mother      Other (heart valve replaced) Father          Allergies  Patient has no known allergies.    Home Medications  No current facility-administered medications on file prior to encounter.     Current Outpatient Medications on File Prior to Encounter   Medication Sig Dispense Refill    Eliquis 5 mg tablet Take 1 tablet (5 mg) by mouth 2 times a day.      Entresto 24-26 mg tablet Take 1 tablet by mouth 2 times a day.      furosemide (Lasix) 40 mg tablet Take 1 tablet (40 mg) by mouth once daily.      Jardiance 10 mg Take 1 tablet (10 mg) by mouth once daily.      lancets (OneTouch Delica Plus Lancet) 30 gauge misc       metFORMIN (Glucophage) 500 mg tablet Take 1 tablet (500 mg) by mouth 2 times a day.      metoprolol succinate XL (Toprol-XL) 50 mg 24 hr tablet Take 1 tablet (50 mg) by mouth once daily.      nitroglycerin (Nitrostat) 0.4 mg SL tablet Place 1 tablet (0.4 mg) under the tongue every 5 minutes if needed for chest pain.      OneTouch Verio test strips strip 2 times a day.      ranolazine (Ranexa) 500 mg 12 hr tablet Take 1 tablet (500 mg) by mouth once daily.      ranolazine (Ranexa) 500 mg 12 hr tablet Take 1 tablet (500 mg) by mouth 2 times a day.      acetaminophen (Tylenol) 325 mg tablet TAKE 2 TABLETS BY MOUTH EVERY 6 HOURS 56 tablet 0    aspirin 81 mg EC tablet Take 1 tablet (81 mg) by mouth once daily.      digoxin (Lanoxin) 125 MCG tablet Take 1  tablet (125 mcg) by mouth once daily.      nitroglycerin (Nitrostat) 0.4 mg SL tablet Place 1 tablet (0.4 mg) under the tongue every 5 minutes if needed for chest pain.      [DISCONTINUED] metoprolol succinate XL (Toprol-XL) 100 mg 24 hr tablet Take 1 tablet (100 mg) by mouth once daily.            Inpatient Medications:  Scheduled medications   Medication Dose Route Frequency     PRN medications   Medication     Continuous Medications   Medication Dose Last Rate    sodium chloride 0.9%  75 mL/hr 75 mL/hr (01/16/24 1032)         Review of Systems   Constitutional: Negative.    HENT: Negative.     Eyes: Negative.    Respiratory: Negative.     Cardiovascular:  Positive for leg swelling.        Trace leg edema, irregular rhythm   Gastrointestinal: Negative.    Endocrine: Negative.    Genitourinary: Negative.    Musculoskeletal: Negative.    Skin: Negative.    Allergic/Immunologic: Negative.    Neurological: Negative.    Hematological: Negative.    Psychiatric/Behavioral: Negative.     All other systems reviewed and are negative.         Physical Exam  Constitutional:       General: He is awake.      Appearance: Normal appearance. He is well-developed.   HENT:      Mouth/Throat:      Mouth: Mucous membranes are moist.   Neck:      Vascular: No JVD.   Cardiovascular:      Rate and Rhythm: Normal rate. Rhythm irregular.      Pulses:           Radial pulses are 2+ on the right side and 2+ on the left side.        Dorsalis pedis pulses are 2+ on the right side and 2+ on the left side.      Heart sounds: Normal heart sounds. No murmur heard.     Comments: Trace leg edema bilat  Pulmonary:      Effort: Pulmonary effort is normal.      Breath sounds: Normal breath sounds.   Abdominal:      General: Bowel sounds are normal.      Palpations: Abdomen is soft.      Tenderness: There is no abdominal tenderness.   Musculoskeletal:      Cervical back: Normal range of motion and neck supple.      Right lower leg: No edema.      Left  "lower leg: No edema.   Skin:     General: Skin is warm and dry.   Neurological:      General: No focal deficit present.      Mental Status: He is alert.   Psychiatric:         Mood and Affect: Mood and affect normal.         Behavior: Behavior is cooperative.        Sedation Plan    ASA 3     Mallampati class: IV.         Last Recorded Vitals  Blood pressure 127/68, pulse 79, temperature 36.4 °C (97.5 °F), temperature source Tympanic, resp. rate 16, height 1.753 m (5' 9.02\"), weight 109 kg (240 lb 4.8 oz), SpO2 96 %.    Relevant Results    Labs    CBC:   Recent Labs     01/12/24  1043 08/01/23  0430 06/06/23  0500 06/05/23  0442 06/03/23  0426 06/02/23  0241   WBC 8.6 14.3* 12.1* 13.7* 10.2 13.6*   HGB 17.8* 15.3 17.1 16.4 14.7 14.6   HCT 56.1* 47.6 52.4* 50.0 45.1 44.4    255 252 251 220 205   MCV 91 89 89 89 89 89     BMP/CMP:   Recent Labs     01/12/24  1043 08/01/23  0430 07/31/23  0710 06/06/23  0500 06/05/23  0442 06/04/23  0450 06/03/23  0433 06/02/23  1318 06/02/23  0241 06/01/23  0512    137 140 137 137 139 136  --  134* 133*   K 4.3 4.2 4.1 4.6 4.4 4.3 3.4*   < > 5.8* 3.5    105 106 103 101 102 102  --  99 99   BUN 16 12 11 13 13 12 15  --  17 15   CREATININE 0.92 0.71 0.80 0.84 0.71 0.63 0.62  --  0.72 0.71   CO2 30 24 27 29 27 28 26  --  29 25   CALCIUM 9.2 8.1* 8.9 8.9 8.6 8.3* 7.6*  --  8.0* 8.1*   PROT  --  5.6*  --   --  6.4 6.0* 5.4*  --  5.9* 5.5*   BILITOT  --  1.2  --   --  1.1 1.3* 1.3*  --  1.7* 3.1*   ALKPHOS  --  56  --   --  147* 150* 155*  --  171* 189*   ALT  --  11  --   --  34 33 29  --  41 53*   AST  --  14  --   --  24 28 21  --  36 32   GLUCOSE 83 105* 99 99 103* 111* 103*  --  110* 121*    < > = values in this interval not displayed.      Lipid Panel:   Recent Labs     05/27/23  0634   CHOL 138   HDL 61.2   CHHDL 2.3   VLDL 11   TRIG 55     Cardiac       No lab exists for component: \"CK\", \"CKMBP\"   Hemoglobin A1C:   Recent Labs     10/27/23  1041 05/27/23  0634 " "03/15/23  1407 09/15/22  1426 03/17/22  1139   HGBA1C 6.2 6.4* 6.5 6.5 7.8*     TSH/ Free T4:   Recent Labs     05/27/23  0634   TSH 1.32     Iron: No results for input(s): \"FERRITIN\", \"TIBC\", \"IRONSAT\", \"BNP\" in the last 32073 hours.  Coag:   Results from last 7 days   Lab Units 01/12/24  1043   INR  1.3*     ABO: No results found for: \"ABO\"    Past Cardiology Tests (Last 3 Years):  EKG:  Encounter Date: 10/26/23   ECG 12 lead (Clinic Performed)    Narrative    Atrial fibrillation     Echo 5/27/23:    CONCLUSIONS:  1. Left ventricular systolic function is low normal with a 45-50% estimated ejection fraction.  2. There is reduced right ventricular systolic function.  3. The patient is in atrial fibrillation which may influence the estimate of left ventricular function and transvalvular flows.               Assessment/Plan  Assessment/Plan   Principal Problem:    Atrial fibrillation (CMS/HCC)  Active Problems:    Diabetes mellitus, type 2 (CMS/HCC)    Polycythemia        Patient is here today for Atrial Fibrillation ablation with Dr. Sauer 1/16/24.       I spent 30 minutes in the professional and overall care of this patient.      MALLORY Farr-CNP    "

## 2024-01-16 NOTE — ANESTHESIA PROCEDURE NOTES
Arterial Line:    Date/Time: 1/16/2024 11:54 AM    Staffing  Performed: SHASHANK   Authorized by: Michael Moreno MD    Performed by: SHASHANK Bruno    An arterial line was placed. Procedure performed using surface landmarks.in the OR for the following indication(s): continuous blood pressure monitoring.    A 20 gauge (size), 1 and 1/4 inch (length), Angiocath (type) catheter was placed into the Right radial artery, secured by Tegaderm,   Seldinger technique not used.        Additional notes:  Area prepped steriley, palpated and placed no issue

## 2024-01-16 NOTE — ANESTHESIA PROCEDURE NOTES
Peripheral IV  Date/Time: 1/16/2024 11:58 AM      Placement  Needle size: 18 G  Laterality: right  Location: hand  Local anesthetic: none  Site prep: alcohol  Technique: anatomical landmarks  Attempts: 1

## 2024-01-17 ENCOUNTER — PHARMACY VISIT (OUTPATIENT)
Dept: PHARMACY | Facility: CLINIC | Age: 74
End: 2024-01-17
Payer: COMMERCIAL

## 2024-01-17 VITALS
BODY MASS INDEX: 35.59 KG/M2 | WEIGHT: 240.3 LBS | HEART RATE: 76 BPM | TEMPERATURE: 98.1 F | RESPIRATION RATE: 18 BRPM | HEIGHT: 69 IN | SYSTOLIC BLOOD PRESSURE: 116 MMHG | DIASTOLIC BLOOD PRESSURE: 82 MMHG | OXYGEN SATURATION: 96 %

## 2024-01-17 LAB
ACT BLD: 264 SEC (ref 96–152)
ACT BLD: 332 SEC (ref 89–169)

## 2024-01-17 PROCEDURE — 2500000001 HC RX 250 WO HCPCS SELF ADMINISTERED DRUGS (ALT 637 FOR MEDICARE OP)

## 2024-01-17 PROCEDURE — 7100000011 HC EXTENDED STAY RECOVERY HOURLY - NURSING UNIT

## 2024-01-17 PROCEDURE — 99232 SBSQ HOSP IP/OBS MODERATE 35: CPT | Performed by: NURSE PRACTITIONER

## 2024-01-17 PROCEDURE — 2500000004 HC RX 250 GENERAL PHARMACY W/ HCPCS (ALT 636 FOR OP/ED): Mod: MUE

## 2024-01-17 PROCEDURE — 2500000005 HC RX 250 GENERAL PHARMACY W/O HCPCS: Performed by: ANESTHESIOLOGY

## 2024-01-17 RX ORDER — APIXABAN 5 MG/1
5 TABLET, FILM COATED ORAL 2 TIMES DAILY
COMMUNITY
Start: 2024-01-17 | End: 2024-03-25 | Stop reason: SDUPTHER

## 2024-01-17 RX ADMIN — RANOLAZINE 500 MG: 500 TABLET, EXTENDED RELEASE ORAL at 09:15

## 2024-01-17 RX ADMIN — APIXABAN 5 MG: 5 TABLET, FILM COATED ORAL at 09:15

## 2024-01-17 RX ADMIN — FUROSEMIDE 40 MG: 40 TABLET ORAL at 09:15

## 2024-01-17 RX ADMIN — METOPROLOL SUCCINATE 50 MG: 50 TABLET, EXTENDED RELEASE ORAL at 09:15

## 2024-01-17 RX ADMIN — Medication: at 08:00

## 2024-01-17 RX ADMIN — PANTOPRAZOLE SODIUM 40 MG: 40 TABLET, DELAYED RELEASE ORAL at 07:00

## 2024-01-17 RX ADMIN — ASPIRIN 81 MG: 81 TABLET, COATED ORAL at 09:15

## 2024-01-17 RX ADMIN — EMPAGLIFLOZIN 10 MG: 10 TABLET, FILM COATED ORAL at 09:15

## 2024-01-17 RX ADMIN — SACUBITRIL AND VALSARTAN 1 TABLET: 24; 26 TABLET, FILM COATED ORAL at 09:15

## 2024-01-17 RX ADMIN — ACETAMINOPHEN 650 MG: 325 TABLET ORAL at 09:15

## 2024-01-17 RX ADMIN — DIGOXIN 125 MCG: 125 TABLET ORAL at 09:15

## 2024-01-17 SDOH — HEALTH STABILITY: MENTAL HEALTH: HOW OFTEN DO YOU HAVE A DRINK CONTAINING ALCOHOL?: NEVER

## 2024-01-17 SDOH — ECONOMIC STABILITY: INCOME INSECURITY: IN THE PAST 12 MONTHS, HAS THE ELECTRIC, GAS, OIL, OR WATER COMPANY THREATENED TO SHUT OFF SERVICE IN YOUR HOME?: NO

## 2024-01-17 SDOH — ECONOMIC STABILITY: FOOD INSECURITY: WITHIN THE PAST 12 MONTHS, THE FOOD YOU BOUGHT JUST DIDN'T LAST AND YOU DIDN'T HAVE MONEY TO GET MORE.: NEVER TRUE

## 2024-01-17 SDOH — SOCIAL STABILITY: SOCIAL INSECURITY
WITHIN THE LAST YEAR, HAVE YOU BEEN KICKED, HIT, SLAPPED, OR OTHERWISE PHYSICALLY HURT BY YOUR PARTNER OR EX-PARTNER?: NO

## 2024-01-17 SDOH — ECONOMIC STABILITY: INCOME INSECURITY: IN THE LAST 12 MONTHS, WAS THERE A TIME WHEN YOU WERE NOT ABLE TO PAY THE MORTGAGE OR RENT ON TIME?: NO

## 2024-01-17 SDOH — SOCIAL STABILITY: SOCIAL INSECURITY: WITHIN THE LAST YEAR, HAVE YOU BEEN AFRAID OF YOUR PARTNER OR EX-PARTNER?: NO

## 2024-01-17 SDOH — HEALTH STABILITY: MENTAL HEALTH: HOW OFTEN DO YOU HAVE 6 OR MORE DRINKS ON ONE OCCASION?: NEVER

## 2024-01-17 SDOH — ECONOMIC STABILITY: INCOME INSECURITY: HOW HARD IS IT FOR YOU TO PAY FOR THE VERY BASICS LIKE FOOD, HOUSING, MEDICAL CARE, AND HEATING?: NOT HARD AT ALL

## 2024-01-17 SDOH — ECONOMIC STABILITY: TRANSPORTATION INSECURITY
IN THE PAST 12 MONTHS, HAS LACK OF TRANSPORTATION KEPT YOU FROM MEETINGS, WORK, OR FROM GETTING THINGS NEEDED FOR DAILY LIVING?: NO

## 2024-01-17 SDOH — SOCIAL STABILITY: SOCIAL INSECURITY
WITHIN THE LAST YEAR, HAVE TO BEEN RAPED OR FORCED TO HAVE ANY KIND OF SEXUAL ACTIVITY BY YOUR PARTNER OR EX-PARTNER?: NO

## 2024-01-17 SDOH — ECONOMIC STABILITY: HOUSING INSECURITY
IN THE LAST 12 MONTHS, WAS THERE A TIME WHEN YOU DID NOT HAVE A STEADY PLACE TO SLEEP OR SLEPT IN A SHELTER (INCLUDING NOW)?: NO

## 2024-01-17 SDOH — SOCIAL STABILITY: SOCIAL INSECURITY: WITHIN THE LAST YEAR, HAVE YOU BEEN HUMILIATED OR EMOTIONALLY ABUSED IN OTHER WAYS BY YOUR PARTNER OR EX-PARTNER?: NO

## 2024-01-17 SDOH — ECONOMIC STABILITY: FOOD INSECURITY: WITHIN THE PAST 12 MONTHS, YOU WORRIED THAT YOUR FOOD WOULD RUN OUT BEFORE YOU GOT MONEY TO BUY MORE.: NEVER TRUE

## 2024-01-17 SDOH — ECONOMIC STABILITY: TRANSPORTATION INSECURITY
IN THE PAST 12 MONTHS, HAS THE LACK OF TRANSPORTATION KEPT YOU FROM MEDICAL APPOINTMENTS OR FROM GETTING MEDICATIONS?: NO

## 2024-01-17 SDOH — HEALTH STABILITY: MENTAL HEALTH: HOW MANY STANDARD DRINKS CONTAINING ALCOHOL DO YOU HAVE ON A TYPICAL DAY?: PATIENT DOES NOT DRINK

## 2024-01-17 SDOH — ECONOMIC STABILITY: HOUSING INSECURITY: IN THE LAST 12 MONTHS, HOW MANY PLACES HAVE YOU LIVED?: 1

## 2024-01-17 ASSESSMENT — LIFESTYLE VARIABLES
SKIP TO QUESTIONS 9-10: 1
AUDIT-C TOTAL SCORE: 0

## 2024-01-17 ASSESSMENT — ACTIVITIES OF DAILY LIVING (ADL): LACK_OF_TRANSPORTATION: NO

## 2024-01-17 ASSESSMENT — PAIN SCALES - GENERAL: PAINLEVEL_OUTOF10: 0 - NO PAIN

## 2024-01-17 NOTE — NURSING NOTE
Dr. Gómez made aware pt c/o CP after drinking a ginger ale. EKG completed, unchanged from previous EKG. Per dr. Gómez no new orders at this time.  Just make sure pt can tolerate breakfast this AM

## 2024-01-17 NOTE — NURSING NOTE

## 2024-01-17 NOTE — CARE PLAN
Problem: Pain  Goal: Takes deep breaths with improved pain control throughout the shift  Outcome: Progressing  Goal: Turns in bed with improved pain control throughout the shift  Outcome: Progressing  Goal: Walks with improved pain control throughout the shift  Outcome: Progressing  Goal: Performs ADL's with improved pain control throughout shift  Outcome: Progressing  Goal: Free from opioid side effects throughout the shift  Outcome: Progressing  Goal: Free from acute confusion related to pain meds throughout the shift  Outcome: Progressing

## 2024-01-17 NOTE — DISCHARGE SUMMARY
Discharge Diagnosis  Atrial fibrillation (CMS/HCC)    Issues Requiring Follow-Up  Post RFCA follow up appointment as scheduled    Test Results Pending At Discharge  Pending Labs       No current pending labs.            Hospital Course   Patient presented for RFCA with Dr. Sauer on 1/16/2024 due to atrial fibrillation.  Procedure was completed without any complications.  Patient was observed overnight under extended recovery. Patient complained of palpitations this morning after having coffee. Telemetry reviewed showed infrequent PVCs. EKG reviewed from 1/17/24 morning showed sinus rhythm.  Patient will be discharged home on prophylactic protonix for 30 days.  Patient will follow-up with Dr. Sauer's office as already scheduled. Discussed with Dr. Sauer who states ok to discharge home.     Pertinent Physical Exam At Time of Discharge  Physical Exam  Constitutional:       General: He is awake. He is not in acute distress.     Appearance: He is not ill-appearing or toxic-appearing.   HENT:      Nose: Nose normal.      Mouth/Throat:      Mouth: Mucous membranes are moist.      Pharynx: Oropharynx is clear.   Eyes:      Extraocular Movements: Extraocular movements intact.      Conjunctiva/sclera: Conjunctivae normal.   Cardiovascular:      Rate and Rhythm: Normal rate and regular rhythm.      Pulses: Normal pulses.           Femoral pulses are 2+ on the right side and 2+ on the left side.       Dorsalis pedis pulses are 2+ on the right side and 2+ on the left side.      Heart sounds: Normal heart sounds. No murmur heard.     Comments: Bilateral groin sites evaluated. Minimal bleeding noted from right groin after bandage removed but hemostasis after mild pressure from provider. No bleeding noted to left groin.   Pulmonary:      Effort: Pulmonary effort is normal.      Breath sounds: Normal breath sounds and air entry.   Abdominal:      General: Bowel sounds are normal. There is no distension.      Palpations:  Abdomen is soft.      Tenderness: There is no abdominal tenderness.   Musculoskeletal:      Cervical back: Normal range of motion and neck supple.      Right lower leg: No edema.      Left lower leg: No edema.   Skin:     General: Skin is warm and dry.   Neurological:      General: No focal deficit present.      Mental Status: He is alert and oriented to person, place, and time. Mental status is at baseline.      GCS: GCS eye subscore is 4. GCS verbal subscore is 5. GCS motor subscore is 6.   Psychiatric:         Mood and Affect: Mood normal.         Behavior: Behavior normal. Behavior is cooperative.         Thought Content: Thought content normal.         Judgment: Judgment normal.         Home Medications     Medication List      START taking these medications     pantoprazole 40 mg EC tablet; Commonly known as: ProtoNix; Take 1 tablet   (40 mg) by mouth once daily. Do not crush, chew, or split.     CHANGE how you take these medications     Eliquis 5 mg tablet; Generic drug: apixaban; What changed: additional   instructions   ranolazine 500 mg 12 hr tablet; Commonly known as: Ranexa; What changed:   Another medication with the same name was removed. Continue taking this   medication, and follow the directions you see here.     CONTINUE taking these medications     acetaminophen 325 mg tablet; Commonly known as: Tylenol; TAKE 2 TABLETS   BY MOUTH EVERY 6 HOURS   aspirin 81 mg EC tablet   digoxin 125 MCG tablet; Commonly known as: Lanoxin   Entresto 24-26 mg tablet; Generic drug: sacubitriL-valsartan   furosemide 40 mg tablet; Commonly known as: Lasix   Jardiance 10 mg; Generic drug: empagliflozin   metFORMIN 500 mg tablet; Commonly known as: Glucophage   metoprolol succinate XL 50 mg 24 hr tablet; Commonly known as: Toprol-XL   * nitroglycerin 0.4 mg SL tablet; Commonly known as: Nitrostat   * nitroglycerin 0.4 mg SL tablet; Commonly known as: Nitrostat   OneTouch Delica Plus Lancet 30 gauge misc; Generic drug:  lancets   OneTouch Verio test strips strip; Generic drug: blood sugar diagnostic  * This list has 2 medication(s) that are the same as other medications   prescribed for you. Read the directions carefully, and ask your doctor or   other care provider to review them with you.       Outpatient Follow-Up  Future Appointments   Date Time Provider Department Center   3/1/2024  9:30 AM MALLORY Leonard-CNP [id number]     More than 30 minutes spent on preparation of discharge, coordination of care, and patient education.    Ricky Mota, APRN-CNP, DNP

## 2024-01-17 NOTE — PROGRESS NOTES
01/17/24 1024   Discharge Planning   Living Arrangements Alone   Support Systems Children;Family members   Assistance Needed transportatio   Type of Residence Private residence   Number of Stairs to Enter Residence 0   Number of Stairs Within Residence 0   Do you have animals or pets at home? No   Home or Post Acute Services None   Patient expects to be discharged to: home   Does the patient need discharge transport arranged? No   Financial Resource Strain   How hard is it for you to pay for the very basics like food, housing, medical care, and heating? Not hard   Housing Stability   In the last 12 months, was there a time when you were not able to pay the mortgage or rent on time? N   In the last 12 months, how many places have you lived? 1   In the last 12 months, was there a time when you did not have a steady place to sleep or slept in a shelter (including now)? N   Transportation Needs   In the past 12 months, has lack of transportation kept you from medical appointments or from getting medications? no   In the past 12 months, has lack of transportation kept you from meetings, work, or from getting things needed for daily living? No     1/17/24 1025  Met with patient at bedside to discuss discharge planning.  Patient lives at home alone in an apartment.  It is on the 9th floor and has an elevator.  He is independent with ADLs.  He does not use any assistive devices for ambulation.  He does not drive.  His daughter or his sister assist with transportation.  He said that his sister is not reliable though due to having an ill spouse.  He plans on returning home at discharge and does not anticipate any discharge needs. He will notify the [facility] should any needs arise.  Paris Stokes RN [facility]

## 2024-01-19 DIAGNOSIS — I48.91 ATRIAL FIBRILLATION, UNSPECIFIED TYPE (MULTI): ICD-10-CM

## 2024-01-19 RX ORDER — DIGOXIN 125 MCG
125 TABLET ORAL DAILY
Qty: 60 TABLET | Refills: 0 | Status: SHIPPED | OUTPATIENT
Start: 2024-01-19 | End: 2024-03-13

## 2024-02-08 ENCOUNTER — LAB (OUTPATIENT)
Dept: LAB | Facility: LAB | Age: 74
End: 2024-02-08
Payer: COMMERCIAL

## 2024-02-08 DIAGNOSIS — I48.91 ATRIAL FIBRILLATION, UNSPECIFIED TYPE (MULTI): ICD-10-CM

## 2024-02-08 LAB — DIGOXIN SERPL-MCNC: 0.72 NG/ML (ref 0.8–?)

## 2024-02-08 PROCEDURE — 36415 COLL VENOUS BLD VENIPUNCTURE: CPT

## 2024-02-08 PROCEDURE — 80162 ASSAY OF DIGOXIN TOTAL: CPT

## 2024-02-26 NOTE — PROGRESS NOTES
Electrophysiology Follow up Visit    Jatin Rodgers is a 73 y.o. year old male patient with    1. HFrEF: prior reduced LV function of 16%, thought to be tachy mediated. EF normalized in May 2021. Most recent LV function 45-50%. Managed by general cardiology     2. Atrial fibrillation: Patient has history of AF with RVR in December 2020. At that time his LV function was 16% with LV thrombus. He was eventually cardioverted and started on amiodarone. Repeat echocardiogram in May 2021 showed LV function normalized. At some point, patient was found to be in AF and amiodarone was discontinued and he was changed to rate control strategy. At the end of May 2023, patient presented to [facility] with diaphoresis and chest discomfort. He was transferred to [facility] and was found to be in AF RVR with hypotension. He had left heart catheterization which showed nonobstructive CAD. Echocardiogram showed LV function 45 to 50%. During hospitalization patient was found to have cholecystitis and had drain placed. He was rate controlled with metoprolol and digoxin. He had successful cholecystectomy.      We saw patient in October 2023. He was rate controlled at that time. AAD vs RFA was discussed and he was agreeable to ablation.    1/16/2024 successful PVI RFA    Patient here for follow up for atrial fibrillation. He reports he is feeling ok with no significant changes since his ablation last month. He denies SOB, chest discomfort, palpitations, syncope. He continues to take Eliquis with no bleeding concerns.     Medical History  He has no past medical history on file.    Social History  He reports that he has never smoked. He has never used smokeless tobacco. He reports that he does not drink alcohol and does not use drugs.      FamHx:   Family History   Problem Relation Name Age of Onset    No Known Problems Mother      Other (heart valve replaced) Father         No Known Allergies     Outpatient Medications:  Current Outpatient Medications    Medication Instructions    acetaminophen (Tylenol) 325 mg tablet TAKE 2 TABLETS BY MOUTH EVERY 6 HOURS    aspirin 81 mg, oral, Daily    digoxin (LANOXIN) 125 mcg, oral, Daily    Eliquis 5 mg, oral, 2 times daily, PLEASE RESUME TONIGHT AS SCHEDULED 1/17/24    Entresto 24-26 mg tablet 1 tablet, oral, 2 times daily    furosemide (LASIX) 40 mg, oral, Daily RT    Jardiance 10 mg, oral, Daily    lancets (OneTouch Delica Plus Lancet) 30 gauge misc miscellaneous    metFORMIN (GLUCOPHAGE) 500 mg, oral, 2 times daily    metoprolol succinate XL (TOPROL-XL) 50 mg, oral, Daily    nitroglycerin (NITROSTAT) 0.4 mg, sublingual, Every 5 min PRN    nitroglycerin (NITROSTAT) 0.4 mg, sublingual, Every 5 min PRN    OneTouch Verio test strips strip 2 times daily    pantoprazole (PROTONIX) 40 mg, oral, Daily, Do not crush, chew, or split.    ranolazine (RANEXA) 500 mg, oral, 2 times daily         Last Recorded Vitals: .vital      1/16/2024     6:17 PM 1/16/2024     8:29 PM 1/17/2024    12:25 AM 1/17/2024     3:11 AM 1/17/2024     8:19 AM 1/17/2024    11:43 AM 3/1/2024     9:38 AM   Vitals   Systolic 91 103 103 128 107 116 118   Diastolic 63 68 68 78 73 82 72   Heart Rate 66 76 76 78 79 76 81   Temp 36 °C (96.8 °F) 36.6 °C (97.8 °F) 36.1 °C (97 °F) 36.7 °C (98.1 °F) 37.3 °C (99.1 °F) 36.7 °C (98.1 °F)    Resp 15 16 18 18 18 18    Weight (lb)       264   BMI       38.97 kg/m2   BSA (m2)       2.42 m2   Visit Report       Report    Visit Vitals  /72   Pulse 81   Wt 120 kg (264 lb)   BMI 38.97 kg/m²   Smoking Status Never   BSA 2.42 m²        Physical Exam  Constitutional:       Appearance: Normal appearance.   Eyes:      Pupils: Pupils are equal, round, and reactive to light.   Cardiovascular:      Rate and Rhythm: Normal rate. Rhythm irregular.      Heart sounds: Normal heart sounds.   Pulmonary:      Effort: Pulmonary effort is normal.      Breath sounds: Normal breath sounds.   Musculoskeletal:         General: Normal range of  motion.   Skin:     General: Skin is warm and dry.   Neurological:      Mental Status: He is alert and oriented to person, place, and time.        Cardiac Testing Reviewed Study(s):  Echo(May/2023):   CONCLUSIONS:  1. Left ventricular systolic function is low normal with a 45-50% estimated ejection fraction.  2. There is reduced right ventricular systolic function.  3. The patient is in atrial fibrillation which may influence the estimate of left ventricular function and transvalvular flows.  Cath (May/2023):   CONCLUSIONS:  1. Non-obstructive coronary artery disease.  ECGs (reviewed and my interpretation):   3/1/2024 atrial fibrillation with rate of 81 bpm    Lab Results   Component Value Date    WBC 8.6 01/12/2024    HGB 17.8 (H) 01/12/2024    HCT 56.1 (H) 01/12/2024     01/12/2024    ALT 11 08/01/2023    AST 14 08/01/2023     01/12/2024    K 4.3 01/12/2024     01/12/2024    CREATININE 0.92 01/12/2024    BUN 16 01/12/2024    CO2 30 01/12/2024    TSH 1.32 05/27/2023    INR 1.3 (H) 01/12/2024       Assessment  Atrial fibrillation: ECG today sinus rhythm. Labs reviewed and Dig level acceptable. Patient with recurrent AF post ablation. We discussed AAD with DCCV to restore sinus rhythm as patient is still in blanking period. I will review case with Dr Sauer and call patient with recommendations. Reminded patient to continue Eliquis with no missed doses for 90 days post ablation as his heart is still healing. Patient agreeable to plan.    PLAN  Continue current medications, including Eliquis uninterrupted  We will call with recommendations        Exclusive of any other services or procedures performed, ITeresa APRN-CNP , spent 40 minutes in duration for this visit today.  This time consisted of chart review, obtaining history, and/or performing the exam as documented above as well as documenting the clinical information for the encounter in the electronic record, discussing  treatment options, plans, and/or goals with patient, family, and/or caregiver, refilling medications, updating the electronic record, ordering medicines, lab work, imaging, referrals, and/or procedures as documented above and communicating with other [facility] professionals. I have discussed the results of laboratory, radiology, and cardiology studies with the patient and their family/caregiver.

## 2024-03-01 ENCOUNTER — OFFICE VISIT (OUTPATIENT)
Dept: CARDIOLOGY | Facility: CLINIC | Age: 74
End: 2024-03-01
Payer: COMMERCIAL

## 2024-03-01 VITALS
WEIGHT: 264 LBS | HEART RATE: 81 BPM | SYSTOLIC BLOOD PRESSURE: 118 MMHG | DIASTOLIC BLOOD PRESSURE: 72 MMHG | BODY MASS INDEX: 38.97 KG/M2

## 2024-03-01 DIAGNOSIS — I48.91 ATRIAL FIBRILLATION, UNSPECIFIED TYPE (MULTI): ICD-10-CM

## 2024-03-01 PROCEDURE — 1036F TOBACCO NON-USER: CPT | Performed by: NURSE PRACTITIONER

## 2024-03-01 PROCEDURE — 1160F RVW MEDS BY RX/DR IN RCRD: CPT | Performed by: NURSE PRACTITIONER

## 2024-03-01 PROCEDURE — 1126F AMNT PAIN NOTED NONE PRSNT: CPT | Performed by: NURSE PRACTITIONER

## 2024-03-01 PROCEDURE — 3074F SYST BP LT 130 MM HG: CPT | Performed by: NURSE PRACTITIONER

## 2024-03-01 PROCEDURE — 93000 ELECTROCARDIOGRAM COMPLETE: CPT | Performed by: INTERNAL MEDICINE

## 2024-03-01 PROCEDURE — 99214 OFFICE O/P EST MOD 30 MIN: CPT | Performed by: NURSE PRACTITIONER

## 2024-03-01 PROCEDURE — 3078F DIAST BP <80 MM HG: CPT | Performed by: NURSE PRACTITIONER

## 2024-03-01 PROCEDURE — 3008F BODY MASS INDEX DOCD: CPT | Performed by: NURSE PRACTITIONER

## 2024-03-01 PROCEDURE — 1159F MED LIST DOCD IN RCRD: CPT | Performed by: NURSE PRACTITIONER

## 2024-03-04 ENCOUNTER — TELEPHONE (OUTPATIENT)
Dept: CARDIOLOGY | Facility: CLINIC | Age: 74
End: 2024-03-04
Payer: COMMERCIAL

## 2024-03-04 DIAGNOSIS — I48.91 ATRIAL FIBRILLATION, UNSPECIFIED TYPE (MULTI): Primary | ICD-10-CM

## 2024-03-04 NOTE — TELEPHONE ENCOUNTER
I called and talked with patient's daughter that after discussion with Dr Sauer we recommend cardioversion for recurrent AF. His daughter is unsure if patient will agree to cardioversion. She will discuss procedure with patient and call us back. Patient did miss a dose of Eliquis on 3/2 so cardioversion would need to be scheduled at least 3 weeks form 3/3.

## 2024-03-06 NOTE — TELEPHONE ENCOUNTER
Talked with patient and his daughter and he is agreeable to DCCV. I stressed the importance of no missed doses of Eliquis.

## 2024-03-08 ENCOUNTER — TELEPHONE (OUTPATIENT)
Dept: CARDIOLOGY | Facility: CLINIC | Age: 74
End: 2024-03-08
Payer: COMMERCIAL

## 2024-03-12 ENCOUNTER — TELEPHONE (OUTPATIENT)
Dept: CARDIOLOGY | Facility: CLINIC | Age: 74
End: 2024-03-12
Payer: COMMERCIAL

## 2024-03-12 PROBLEM — K42.9 UMBILICAL HERNIA WITHOUT OBSTRUCTION OR GANGRENE: Status: ACTIVE | Noted: 2023-08-05

## 2024-03-12 PROBLEM — I50.32 CHRONIC DIASTOLIC (CONGESTIVE) HEART FAILURE (MULTI): Status: ACTIVE | Noted: 2023-05-27

## 2024-03-12 PROBLEM — R79.89 ELEVATED LIVER FUNCTION TESTS: Status: ACTIVE | Noted: 2024-03-12

## 2024-03-12 PROBLEM — I50.42 CHRONIC COMBINED SYSTOLIC AND DIASTOLIC HEART FAILURE (MULTI): Status: ACTIVE | Noted: 2021-01-21

## 2024-03-12 PROBLEM — R07.9 CHEST PAIN: Status: ACTIVE | Noted: 2023-05-27

## 2024-03-12 PROBLEM — I25.10 ATHEROSCLEROTIC HEART DISEASE OF NATIVE CORONARY ARTERY WITHOUT ANGINA PECTORIS: Status: ACTIVE | Noted: 2023-06-07

## 2024-03-12 PROBLEM — Z91.199 NONCOMPLIANCE WITH TREATMENT: Status: ACTIVE | Noted: 2021-06-17

## 2024-03-12 PROBLEM — E66.01 MORBID (SEVERE) OBESITY DUE TO EXCESS CALORIES (MULTI): Status: ACTIVE | Noted: 2023-05-27

## 2024-03-12 PROBLEM — R00.1 BRADYCARDIA, UNSPECIFIED: Status: ACTIVE | Noted: 2021-05-27

## 2024-03-12 PROBLEM — R74.01 ELEVATION OF LEVELS OF LIVER TRANSAMINASE LEVELS: Status: ACTIVE | Noted: 2021-01-21

## 2024-03-12 PROBLEM — G47.33 OBSTRUCTIVE SLEEP APNEA: Status: ACTIVE | Noted: 2021-06-17

## 2024-03-12 PROBLEM — I27.20 PULMONARY HYPERTENSION (MULTI): Status: ACTIVE | Noted: 2021-06-17

## 2024-03-12 PROBLEM — I51.3 LV (LEFT VENTRICULAR) MURAL THROMBUS: Status: ACTIVE | Noted: 2021-06-17

## 2024-03-12 PROBLEM — K76.0 FATTY LIVER: Status: ACTIVE | Noted: 2022-05-05

## 2024-03-12 PROBLEM — E78.2 MIXED HYPERLIPIDEMIA: Status: ACTIVE | Noted: 2022-05-05

## 2024-03-12 PROBLEM — G47.31 PRIMARY CENTRAL SLEEP APNEA: Status: ACTIVE | Noted: 2021-01-26

## 2024-03-12 PROBLEM — I34.0 NON-RHEUMATIC MITRAL REGURGITATION: Status: ACTIVE | Noted: 2021-06-17

## 2024-03-12 PROBLEM — K81.0 ACUTE CHOLECYSTITIS: Status: ACTIVE | Noted: 2023-08-05

## 2024-03-12 PROBLEM — I36.1 NONRHEUMATIC TRICUSPID (VALVE) INSUFFICIENCY: Status: ACTIVE | Noted: 2021-06-17

## 2024-03-12 PROBLEM — D72.829 LEUKOCYTOSIS: Status: ACTIVE | Noted: 2024-03-12

## 2024-03-12 NOTE — TELEPHONE ENCOUNTER
Spoke with patient's daughter to arrange DCCV. They would like this to be scheduled at [facility]. Order faxed to [facility] scheduling. - Getachew CARLTON    37.2

## 2024-03-13 ENCOUNTER — TELEPHONE (OUTPATIENT)
Dept: CARDIOLOGY | Facility: CLINIC | Age: 74
End: 2024-03-13
Payer: COMMERCIAL

## 2024-03-13 DIAGNOSIS — I48.91 ATRIAL FIBRILLATION, UNSPECIFIED TYPE (MULTI): ICD-10-CM

## 2024-03-13 RX ORDER — DIGOXIN 125 MCG
125 TABLET ORAL DAILY
Qty: 90 TABLET | Refills: 1 | Status: SHIPPED | OUTPATIENT
Start: 2024-03-13 | End: 2024-03-25 | Stop reason: SDUPTHER

## 2024-03-13 NOTE — TELEPHONE ENCOUNTER
Rosemary discussed refills with me and Dr Sauer will refill antiarrhythmic medication, but patient is requesting refill of Entresto. Patient has an appt with Dr. Coker for first time in office on 3/22/24. Patient was seen by our cardiologist during hospital admission 5/2023 but discharge summary shows he would be following with his cardiologist Dr. Lopez at [facility]. I called and spoke with patient's daughter Valery and let her know until we establish care with him on 3/22/24 refill of Entresto will have to be refilled short term by his previous cardiologist or PCP and then we will take over refills once seen by Dr Coker.    I called and requested cardiology records from Dr Lopez's office. Last seen by Dr Lopez 2022.

## 2024-03-14 ENCOUNTER — TELEPHONE (OUTPATIENT)
Dept: CARDIOLOGY | Facility: CLINIC | Age: 74
End: 2024-03-14
Payer: COMMERCIAL

## 2024-03-14 NOTE — TELEPHONE ENCOUNTER
Spoke with patient's daughter Valery regarding scheduling Jatin's DCCV. Originally wanted this to be at [facility] but patient is going to be establishing with Dr. Coker at [facility] and will be more appropriate to have DCCV at [facility]. Patient's daughter verbalized understanding, will proceed with 4/17 [place], going to aim for 11 AM appointment to accommodate daughter's work schedule. Urmila CARLTON

## 2024-03-22 ENCOUNTER — APPOINTMENT (OUTPATIENT)
Dept: CARDIOLOGY | Facility: CLINIC | Age: 74
End: 2024-03-22
Payer: COMMERCIAL

## 2024-03-24 NOTE — H&P (VIEW-ONLY)
Counseling:  The patient was counseled regarding diagnostic results, instructions for management, risk factor reductions, prognosis, patient and family education, impressions, risks and benefits of treatment options and importance of compliance with treatment.      Chief Complaint:  The patient presents today for cardiovascular evaluation of atrial fibrillation and CAD.     History Of Present Illness:    Jatin Rodgers is a 74 y.o. male patient whose PMH is significant for chronic combined systolic and diastolic heart failure, atrial fibrillation, pulmonary HTN, DM type 2, umbilication hernia and JORDEN. He presents today in the company of his wife to establish cardiovascular care for the evaluation and management of atrial fibrillation and CAD. The patient has been established with Dr. Sauer, and on 01/16/2024 underwent pulmonary vein isolation. Unfortunately, the patient has since reverted back to a-fib and orders have been placed for a DCC through our office. Today, the patient states that he is feeling relatively well. EKG today shows that the patient is in atrial fibrillation. Per the patient's wife, he is currently scheduled for DCC next month as he had missed a dose of Eliquis. The patient is compliant with his prescribed medications.      Past Surgical History:  He has a past surgical history that includes IR biliary cholangiogram (7/3/2023) and Cardiac electrophysiology procedure (N/A, 1/16/2024).      Social History:  He reports that he has never smoked. He has never used smokeless tobacco. He reports that he does not drink alcohol and does not use drugs.    Family History:  Family History   Problem Relation Name Age of Onset    No Known Problems Mother      Other (heart valve replaced) Father          Allergies:  Patient has no known allergies.    Outpatient Medications:  Current Outpatient Medications   Medication Instructions    acetaminophen (Tylenol) 325 mg tablet TAKE 2 TABLETS BY MOUTH EVERY 6 HOURS     aspirin 81 mg, oral, Daily    digoxin (LANOXIN) 125 mcg, oral, Daily    Eliquis 5 mg, oral, 2 times daily, PLEASE RESUME TONIGHT AS SCHEDULED 1/17/24    furosemide (LASIX) 40 mg, oral, Daily RT    Jardiance 10 mg, oral, Daily    lancets (OneTouch Delica Plus Lancet) 30 gauge misc miscellaneous    metFORMIN (GLUCOPHAGE) 500 mg, oral, 2 times daily    metoprolol succinate XL (TOPROL XL) 100 mg, oral, Daily, Do not crush or chew.    nitroglycerin (NITROSTAT) 0.4 mg, sublingual, Every 5 min PRN    nitroglycerin (NITROSTAT) 0.4 mg, sublingual, Every 5 min PRN    OneTouch Verio test strips strip 2 times daily    pantoprazole (PROTONIX) 40 mg, oral, Daily, Do not crush, chew, or split.    sacubitriL-valsartan (Entresto) 49-51 mg tablet 1 tablet, oral, 2 times daily        Last Recorded Vitals:  Vitals:    03/25/24 1017   BP: 132/88   Pulse: 92   Weight: 122 kg (269 lb)       Review of Systems   All other systems reviewed and are negative.     Physical Exam:  Constitutional:       Appearance: Healthy appearance. Not in distress.   Neck:      Vascular: No JVR. JVD normal.   Pulmonary:      Effort: Pulmonary effort is normal.      Breath sounds: Normal breath sounds. No wheezing. No rhonchi. No rales.   Chest:      Chest wall: Not tender to palpatation.   Cardiovascular:      PMI at left midclavicular line. Normal rate. Regular rhythm. Normal S1. Normal S2.       Murmurs: There is no murmur.      No gallop.  No click. No rub.   Pulses:     Intact distal pulses.   Edema:     Peripheral edema absent.   Abdominal:      General: Bowel sounds are normal.      Palpations: Abdomen is soft.      Tenderness: There is no abdominal tenderness.   Musculoskeletal: Normal range of motion.         General: No tenderness. Skin:     General: Skin is warm and dry.   Neurological:      General: No focal deficit present.      Mental Status: Alert and oriented to person, place and time.            Last Labs:  CBC -  Lab Results   Component  Value Date    WBC 8.6 01/12/2024    HGB 17.8 (H) 01/12/2024    HCT 56.1 (H) 01/12/2024    MCV 91 01/12/2024     01/12/2024       CMP -  Lab Results   Component Value Date    CALCIUM 9.2 01/12/2024    PHOS 3.9 05/27/2023    PROT 5.6 (L) 08/01/2023    ALBUMIN 3.5 08/01/2023    AST 14 08/01/2023    ALT 11 08/01/2023    ALKPHOS 56 08/01/2023    BILITOT 1.2 08/01/2023       LIPID PANEL -   Lab Results   Component Value Date    CHOL 138 05/27/2023    TRIG 55 05/27/2023    HDL 61.2 05/27/2023    CHHDL 2.3 05/27/2023    LDLF 66 05/27/2023    VLDL 11 05/27/2023       RENAL FUNCTION PANEL -   Lab Results   Component Value Date    GLUCOSE 83 01/12/2024     01/12/2024    K 4.3 01/12/2024     01/12/2024    CO2 30 01/12/2024    ANIONGAP 11 01/12/2024    BUN 16 01/12/2024    CREATININE 0.92 01/12/2024    GFRMALE >90 08/01/2023    CALCIUM 9.2 01/12/2024    PHOS 3.9 05/27/2023    ALBUMIN 3.5 08/01/2023        Lab Results   Component Value Date    HGBA1C 6.2 10/27/2023       Last Cardiology Tests:  01/19/2024 - Electrophysiology Procedure  Successful pulmonary vein isolation for atrial fibrillation     05/30/2023 - Cardiac Catheterization (LH)  Non-obstructive coronary artery disease.     05/27/2023 - TTE  1. Left ventricular systolic function is low normal with a 45-50% estimated ejection fraction.  2. There is reduced right ventricular systolic function.  3. The patient is in atrial fibrillation which may influence the estimate of left ventricular function and transvalvular flows.  4. The aortic root is abnormal. There is mild dilatation of the ascending aorta (4.0 cm)     12/02/2020 - TTE  1. The patient was in atrial fibrillation during the study.  2. The left ventricle is mildly dilated. Global left ventricular systolic function is severely reduced with an LVEF estimated at 16%. The segmented left ventricular dysfunction is as shown in the diagram.  3. The right ventricle is mildly dilated. Right ventricular  systolic function is mild to moderately reduced.  4. There is mild mitral regurgitation.   5. There is moderate tricuspid regurgitation.  6. RVSP estimated at 32+20= 52 mmHg; consistent with moderate pulmonary HTN.  7. There is a 1.8 cm spherical echogenic mass at the LV apex.  8. The left atrium is moderately dilated.  9. The right atrium is moderately dilated.  10. Mild aortic root dilatation.  11. The IVC is dilated and does not significantly change with respiration.     Assessment/Plan   1) Atrial Fibrillation  On Eliquis 5 mg BID, metoprolol succinate 50 mg daily  Established with Dr. Sauer   S/P pulmonary vein isolation 01/16/2024  Patient subsequently reverted back to a-fib  Orders for DCC placed through our office   Currently scheduled for DCC next month as he had missed a dose of Eliquis  Plan for ZHANNA/cardioversion with anesthesia ASAP    2) CAD  On ASA 81 mg daily, Entresto 24-26 mg BID, furosemide 40 mg daily, Jardiance 10 mg daily, metoprolol succinate 100 mg daily, Ranexa 500 mg BID.  TTE 05/27/2023 with LVEF 45-50%, reduced RV systolic function, in atrial fibrillation, mild dilatation of ascending aorta (4.0 cm)   C 05/30/2023 with non-obstructive CAD  Discontinue Ranexa  Increase Entresto to 49-51 mg BID        Scribe Attestation  By signing my name below, I, Jesusita Edwards, Scribe   attest that this documentation has been prepared under the direction and in the presence of Ran Coker MD.

## 2024-03-25 ENCOUNTER — TELEPHONE (OUTPATIENT)
Dept: CARDIOLOGY | Facility: CLINIC | Age: 74
End: 2024-03-25

## 2024-03-25 ENCOUNTER — OFFICE VISIT (OUTPATIENT)
Dept: CARDIOLOGY | Facility: CLINIC | Age: 74
End: 2024-03-25
Payer: COMMERCIAL

## 2024-03-25 VITALS
BODY MASS INDEX: 39.71 KG/M2 | WEIGHT: 269 LBS | DIASTOLIC BLOOD PRESSURE: 88 MMHG | SYSTOLIC BLOOD PRESSURE: 132 MMHG | HEART RATE: 92 BPM

## 2024-03-25 DIAGNOSIS — I48.0 PAROXYSMAL ATRIAL FIBRILLATION (MULTI): Primary | ICD-10-CM

## 2024-03-25 DIAGNOSIS — I48.91 ATRIAL FIBRILLATION, UNSPECIFIED TYPE (MULTI): ICD-10-CM

## 2024-03-25 PROCEDURE — 1160F RVW MEDS BY RX/DR IN RCRD: CPT | Performed by: INTERNAL MEDICINE

## 2024-03-25 PROCEDURE — 3079F DIAST BP 80-89 MM HG: CPT | Performed by: INTERNAL MEDICINE

## 2024-03-25 PROCEDURE — 93000 ELECTROCARDIOGRAM COMPLETE: CPT | Performed by: INTERNAL MEDICINE

## 2024-03-25 PROCEDURE — 3075F SYST BP GE 130 - 139MM HG: CPT | Performed by: INTERNAL MEDICINE

## 2024-03-25 PROCEDURE — 1036F TOBACCO NON-USER: CPT | Performed by: INTERNAL MEDICINE

## 2024-03-25 PROCEDURE — 99213 OFFICE O/P EST LOW 20 MIN: CPT | Performed by: INTERNAL MEDICINE

## 2024-03-25 PROCEDURE — 3008F BODY MASS INDEX DOCD: CPT | Performed by: INTERNAL MEDICINE

## 2024-03-25 PROCEDURE — 1159F MED LIST DOCD IN RCRD: CPT | Performed by: INTERNAL MEDICINE

## 2024-03-25 RX ORDER — FUROSEMIDE 40 MG/1
40 TABLET ORAL
Qty: 90 TABLET | Refills: 3 | Status: SHIPPED | OUTPATIENT
Start: 2024-03-25 | End: 2025-03-25

## 2024-03-25 RX ORDER — METOPROLOL SUCCINATE 100 MG/1
100 TABLET, EXTENDED RELEASE ORAL DAILY
Qty: 90 TABLET | Refills: 3 | Status: SHIPPED | OUTPATIENT
Start: 2024-03-25 | End: 2025-03-25

## 2024-03-25 RX ORDER — DIGOXIN 125 MCG
125 TABLET ORAL DAILY
Qty: 90 TABLET | Refills: 3 | Status: SHIPPED | OUTPATIENT
Start: 2024-03-25 | End: 2025-03-25

## 2024-03-25 RX ORDER — APIXABAN 5 MG/1
5 TABLET, FILM COATED ORAL 2 TIMES DAILY
Qty: 60 TABLET | Refills: 3 | Status: SHIPPED | OUTPATIENT
Start: 2024-03-25 | End: 2025-03-25

## 2024-03-25 NOTE — TELEPHONE ENCOUNTER
----- Message from Nelly Babin sent at 3/25/2024 11:25 AM EDT -----  Regarding: Reminder/Instructions for ZHANNA plus DCCV - Dr. Sheela Rivera - ZHANNA and DCCV w/ Dr. Coker    Monday, April 1st - Arrive 6:30 AM. Start time 7:30 AM     Patient given the following instructions at time of scheduling:    NPO from midnight prior to procedure  Take all morning meds w/ a small sip of water OR bring meds to hospital to administer s/p procedure  Labs to be updated this week     Thank you!

## 2024-03-25 NOTE — LETTER
March 25, 2024     Kunal Riley MD  [address] [facility] Office [address]ld[address]  [facility] [address]    Patient: Jaitn Rodgers   YOB: 1950   Date of Visit: 3/25/2024       Dear Dr. Kunal Riley MD:    Thank you for referring Jatin Rodgers to me for evaluation. Below are my notes for this consultation.  If you have questions, please do not hesitate to call me. I look forward to following your patient along with you.       Sincerely,     Ran Coker MD      CC: Aaron Gómez MD  ______________________________________________________________________________________    Counseling:  The patient was counseled regarding diagnostic results, instructions for management, risk factor reductions, prognosis, patient and family education, impressions, risks and benefits of treatment options and importance of compliance with treatment.      Chief Complaint:  The patient presents today for cardiovascular evaluation of atrial fibrillation and CAD.     History Of Present Illness:    Jatin Rodgers is a 74 y.o. male patient whose PMH is significant for chronic combined systolic and diastolic heart failure, atrial fibrillation, pulmonary HTN, DM type 2, umbilication hernia and JORDEN. He presents today in the company of his wife to establish cardiovascular care for the evaluation and management of atrial fibrillation and CAD. The patient has been established with Dr. Sauer, and on 01/16/2024 underwent pulmonary vein isolation. Unfortunately, the patient has since reverted back to a-fib and orders have been placed for a DCC through our office. Today, the patient states that he is feeling relatively well. EKG today shows that the patient is in atrial fibrillation. Per the patient's wife, he is currently scheduled for DCC next month as he had missed a dose of Eliquis. The patient is compliant with his prescribed medications.      Past  Surgical History:  He has a past surgical history that includes IR biliary cholangiogram (7/3/2023) and Cardiac electrophysiology procedure (N/A, 1/16/2024).      Social History:  He reports that he has never smoked. He has never used smokeless tobacco. He reports that he does not drink alcohol and does not use drugs.    Family History:  Family History   Problem Relation Name Age of Onset   • No Known Problems Mother     • Other (heart valve replaced) Father          Allergies:  Patient has no known allergies.    Outpatient Medications:  Current Outpatient Medications   Medication Instructions   • acetaminophen (Tylenol) 325 mg tablet TAKE 2 TABLETS BY MOUTH EVERY 6 HOURS   • aspirin 81 mg, oral, Daily   • digoxin (LANOXIN) 125 mcg, oral, Daily   • Eliquis 5 mg, oral, 2 times daily, PLEASE RESUME TONIGHT AS SCHEDULED 1/17/24   • furosemide (LASIX) 40 mg, oral, Daily RT   • Jardiance 10 mg, oral, Daily   • lancets (OneTouch Delica Plus Lancet) 30 gauge misc miscellaneous   • metFORMIN (GLUCOPHAGE) 500 mg, oral, 2 times daily   • metoprolol succinate XL (TOPROL XL) 100 mg, oral, Daily, Do not crush or chew.   • nitroglycerin (NITROSTAT) 0.4 mg, sublingual, Every 5 min PRN   • nitroglycerin (NITROSTAT) 0.4 mg, sublingual, Every 5 min PRN   • OneTouch Verio test strips strip 2 times daily   • pantoprazole (PROTONIX) 40 mg, oral, Daily, Do not crush, chew, or split.   • sacubitriL-valsartan (Entresto) 49-51 mg tablet 1 tablet, oral, 2 times daily        Last Recorded Vitals:  Vitals:    03/25/24 1017   BP: 132/88   Pulse: 92   Weight: 122 kg (269 lb)       Review of Systems   All other systems reviewed and are negative.     Physical Exam:  Constitutional:       Appearance: Healthy appearance. Not in distress.   Neck:      Vascular: No JVR. JVD normal.   Pulmonary:      Effort: Pulmonary effort is normal.      Breath sounds: Normal breath sounds. No wheezing. No rhonchi. No rales.   Chest:      Chest wall: Not tender to  palpatation.   Cardiovascular:      PMI at left midclavicular line. Normal rate. Regular rhythm. Normal S1. Normal S2.       Murmurs: There is no murmur.      No gallop.  No click. No rub.   Pulses:     Intact distal pulses.   Edema:     Peripheral edema absent.   Abdominal:      General: Bowel sounds are normal.      Palpations: Abdomen is soft.      Tenderness: There is no abdominal tenderness.   Musculoskeletal: Normal range of motion.         General: No tenderness. Skin:     General: Skin is warm and dry.   Neurological:      General: No focal deficit present.      Mental Status: Alert and oriented to person, place and time.            Last Labs:  CBC -  Lab Results   Component Value Date    WBC 8.6 01/12/2024    HGB 17.8 (H) 01/12/2024    HCT 56.1 (H) 01/12/2024    MCV 91 01/12/2024     01/12/2024       CMP -  Lab Results   Component Value Date    CALCIUM 9.2 01/12/2024    PHOS 3.9 05/27/2023    PROT 5.6 (L) 08/01/2023    ALBUMIN 3.5 08/01/2023    AST 14 08/01/2023    ALT 11 08/01/2023    ALKPHOS 56 08/01/2023    BILITOT 1.2 08/01/2023       LIPID PANEL -   Lab Results   Component Value Date    CHOL 138 05/27/2023    TRIG 55 05/27/2023    HDL 61.2 05/27/2023    CHHDL 2.3 05/27/2023    LDLF 66 05/27/2023    VLDL 11 05/27/2023       RENAL FUNCTION PANEL -   Lab Results   Component Value Date    GLUCOSE 83 01/12/2024     01/12/2024    K 4.3 01/12/2024     01/12/2024    CO2 30 01/12/2024    ANIONGAP 11 01/12/2024    BUN 16 01/12/2024    CREATININE 0.92 01/12/2024    GFRMALE >90 08/01/2023    CALCIUM 9.2 01/12/2024    PHOS 3.9 05/27/2023    ALBUMIN 3.5 08/01/2023        Lab Results   Component Value Date    HGBA1C 6.2 10/27/2023       Last Cardiology Tests:  01/19/2024 - Electrophysiology Procedure  Successful pulmonary vein isolation for atrial fibrillation     05/30/2023 - Cardiac Catheterization (LH)  Non-obstructive coronary artery disease.     05/27/2023 - TTE  1. Left ventricular systolic  function is low normal with a 45-50% estimated ejection fraction.  2. There is reduced right ventricular systolic function.  3. The patient is in atrial fibrillation which may influence the estimate of left ventricular function and transvalvular flows.  4. The aortic root is abnormal. There is mild dilatation of the ascending aorta (4.0 cm)     12/02/2020 - TTE  1. The patient was in atrial fibrillation during the study.  2. The left ventricle is mildly dilated. Global left ventricular systolic function is severely reduced with an LVEF estimated at 16%. The segmented left ventricular dysfunction is as shown in the diagram.  3. The right ventricle is mildly dilated. Right ventricular systolic function is mild to moderately reduced.  4. There is mild mitral regurgitation.   5. There is moderate tricuspid regurgitation.  6. RVSP estimated at 32+20= 52 mmHg; consistent with moderate pulmonary HTN.  7. There is a 1.8 cm spherical echogenic mass at the LV apex.  8. The left atrium is moderately dilated.  9. The right atrium is moderately dilated.  10. Mild aortic root dilatation.  11. The IVC is dilated and does not significantly change with respiration.     Assessment/Plan  1) Atrial Fibrillation  On Eliquis 5 mg BID, metoprolol succinate 50 mg daily  Established with Dr. Sauer   S/P pulmonary vein isolation 01/16/2024  Patient subsequently reverted back to a-fib  Orders for DCC placed through our office   Currently scheduled for DCC next month as he had missed a dose of Eliquis  Plan for ZHANNA/cardioversion with anesthesia ASAP    2) CAD  On ASA 81 mg daily, Entresto 24-26 mg BID, furosemide 40 mg daily, Jardiance 10 mg daily, metoprolol succinate 100 mg daily, Ranexa 500 mg BID.  TTE 05/27/2023 with LVEF 45-50%, reduced RV systolic function, in atrial fibrillation, mild dilatation of ascending aorta (4.0 cm)   [facility] 05/30/2023 with non-obstructive CAD  Discontinue Ranexa  Increase Entresto to 49-51 mg BID        Scribe  Attestation  By signing my name below, I, Yrn Whitney   attest that this documentation has been prepared under the direction and in the presence of Ran Coker MD.

## 2024-03-25 NOTE — PATIENT INSTRUCTIONS
Discontinue Ranexa.  Increase Entresto to 49-51 mg twice daily. A prescription for the new dose has been sent to your pharmacy.   Continue all other medications as prescribed.   Dr. Coker has placed orders for a ZHANNA (echocardiogram or ultrasound of the heart performed via the esophagus)/cardioversion to get you back into a regular rhythm.  Followup with Dr. Coker after the cardioversion.     If you have any questions or cardiac concerns, please call our office at [phone].

## 2024-03-27 ENCOUNTER — LAB (OUTPATIENT)
Dept: LAB | Facility: LAB | Age: 74
End: 2024-03-27
Payer: COMMERCIAL

## 2024-03-27 DIAGNOSIS — I48.0 PAROXYSMAL ATRIAL FIBRILLATION (MULTI): ICD-10-CM

## 2024-03-27 DIAGNOSIS — I48.91 ATRIAL FIBRILLATION, UNSPECIFIED TYPE (MULTI): ICD-10-CM

## 2024-03-27 LAB
ALBUMIN SERPL BCP-MCNC: 4.2 G/DL (ref 3.4–5)
ALP SERPL-CCNC: 65 U/L (ref 33–136)
ALT SERPL W P-5'-P-CCNC: 14 U/L (ref 10–52)
ANION GAP SERPL CALC-SCNC: 10 MMOL/L (ref 10–20)
AST SERPL W P-5'-P-CCNC: 13 U/L (ref 9–39)
BASOPHILS # BLD AUTO: 0.06 X10*3/UL (ref 0–0.1)
BASOPHILS NFR BLD AUTO: 0.7 %
BILIRUB SERPL-MCNC: 0.7 MG/DL (ref 0–1.2)
BUN SERPL-MCNC: 13 MG/DL (ref 6–23)
CALCIUM SERPL-MCNC: 8.9 MG/DL (ref 8.6–10.3)
CHLORIDE SERPL-SCNC: 103 MMOL/L (ref 98–107)
CO2 SERPL-SCNC: 30 MMOL/L (ref 21–32)
CREAT SERPL-MCNC: 0.94 MG/DL (ref 0.5–1.3)
EGFRCR SERPLBLD CKD-EPI 2021: 86 ML/MIN/1.73M*2
EOSINOPHIL # BLD AUTO: 0.21 X10*3/UL (ref 0–0.4)
EOSINOPHIL NFR BLD AUTO: 2.3 %
ERYTHROCYTE [DISTWIDTH] IN BLOOD BY AUTOMATED COUNT: 14.1 % (ref 11.5–14.5)
GLUCOSE SERPL-MCNC: 111 MG/DL (ref 74–99)
HCT VFR BLD AUTO: 53.7 % (ref 41–52)
HGB BLD-MCNC: 17.2 G/DL (ref 13.5–17.5)
IMM GRANULOCYTES # BLD AUTO: 0.05 X10*3/UL (ref 0–0.5)
IMM GRANULOCYTES NFR BLD AUTO: 0.5 % (ref 0–0.9)
INR PPP: 1.2 (ref 0.9–1.1)
LYMPHOCYTES # BLD AUTO: 2.33 X10*3/UL (ref 0.8–3)
LYMPHOCYTES NFR BLD AUTO: 25.3 %
MCH RBC QN AUTO: 29.4 PG (ref 26–34)
MCHC RBC AUTO-ENTMCNC: 32 G/DL (ref 32–36)
MCV RBC AUTO: 92 FL (ref 80–100)
MONOCYTES # BLD AUTO: 1.02 X10*3/UL (ref 0.05–0.8)
MONOCYTES NFR BLD AUTO: 11.1 %
NEUTROPHILS # BLD AUTO: 5.53 X10*3/UL (ref 1.6–5.5)
NEUTROPHILS NFR BLD AUTO: 60.1 %
NRBC BLD-RTO: 0 /100 WBCS (ref 0–0)
PLATELET # BLD AUTO: 252 X10*3/UL (ref 150–450)
POTASSIUM SERPL-SCNC: 3.6 MMOL/L (ref 3.5–5.3)
PROT SERPL-MCNC: 6.3 G/DL (ref 6.4–8.2)
PROTHROMBIN TIME: 13.1 SECONDS (ref 9.8–12.8)
RBC # BLD AUTO: 5.85 X10*6/UL (ref 4.5–5.9)
SODIUM SERPL-SCNC: 139 MMOL/L (ref 136–145)
WBC # BLD AUTO: 9.2 X10*3/UL (ref 4.4–11.3)

## 2024-03-27 PROCEDURE — 85610 PROTHROMBIN TIME: CPT

## 2024-03-27 PROCEDURE — 80053 COMPREHEN METABOLIC PANEL: CPT

## 2024-03-27 PROCEDURE — 85025 COMPLETE CBC W/AUTO DIFF WBC: CPT

## 2024-03-27 PROCEDURE — 36415 COLL VENOUS BLD VENIPUNCTURE: CPT

## 2024-03-28 ENCOUNTER — TELEPHONE (OUTPATIENT)
Dept: CARDIOLOGY | Facility: CLINIC | Age: 74
End: 2024-03-28
Payer: COMMERCIAL

## 2024-03-30 ENCOUNTER — ANESTHESIA EVENT (OUTPATIENT)
Dept: CARDIOLOGY | Facility: HOSPITAL | Age: 74
End: 2024-03-30
Payer: COMMERCIAL

## 2024-04-01 ENCOUNTER — HOSPITAL ENCOUNTER (OUTPATIENT)
Dept: CARDIOLOGY | Facility: HOSPITAL | Age: 74
Discharge: HOME | End: 2024-04-01
Payer: COMMERCIAL

## 2024-04-01 ENCOUNTER — ANESTHESIA (OUTPATIENT)
Dept: CARDIOLOGY | Facility: HOSPITAL | Age: 74
End: 2024-04-01
Payer: COMMERCIAL

## 2024-04-01 VITALS
HEART RATE: 74 BPM | SYSTOLIC BLOOD PRESSURE: 107 MMHG | RESPIRATION RATE: 16 BRPM | DIASTOLIC BLOOD PRESSURE: 75 MMHG | OXYGEN SATURATION: 100 % | TEMPERATURE: 98 F

## 2024-04-01 DIAGNOSIS — I48.19 OTHER PERSISTENT ATRIAL FIBRILLATION (MULTI): ICD-10-CM

## 2024-04-01 DIAGNOSIS — I48.0 PAROXYSMAL ATRIAL FIBRILLATION (MULTI): ICD-10-CM

## 2024-04-01 DIAGNOSIS — I48.91 ATRIAL FIBRILLATION, UNSPECIFIED TYPE (MULTI): ICD-10-CM

## 2024-04-01 LAB
ATRIAL RATE: 76 BPM
P AXIS: 39 DEGREES
P OFFSET: 175 MS
P ONSET: 92 MS
PR INTERVAL: 234 MS
Q ONSET: 209 MS
QRS COUNT: 13 BEATS
QRS DURATION: 96 MS
QT INTERVAL: 404 MS
QTC CALCULATION(BAZETT): 454 MS
QTC FREDERICIA: 436 MS
R AXIS: -41 DEGREES
T AXIS: 83 DEGREES
T OFFSET: 411 MS
VENTRICULAR RATE: 76 BPM

## 2024-04-01 PROCEDURE — 7100000009 HC PHASE TWO TIME - INITIAL BASE CHARGE

## 2024-04-01 PROCEDURE — 92960 CARDIOVERSION ELECTRIC EXT: CPT | Performed by: INTERNAL MEDICINE

## 2024-04-01 PROCEDURE — 7100000010 HC PHASE TWO TIME - EACH INCREMENTAL 1 MINUTE

## 2024-04-01 PROCEDURE — 93325 DOPPLER ECHO COLOR FLOW MAPG: CPT | Performed by: INTERNAL MEDICINE

## 2024-04-01 PROCEDURE — 93312 ECHO TRANSESOPHAGEAL: CPT | Mod: 59

## 2024-04-01 PROCEDURE — 93318 ECHO TRANSESOPHAGEAL INTRAOP: CPT

## 2024-04-01 PROCEDURE — 93312 ECHO TRANSESOPHAGEAL: CPT | Performed by: INTERNAL MEDICINE

## 2024-04-01 PROCEDURE — 3700000001 HC GENERAL ANESTHESIA TIME - INITIAL BASE CHARGE

## 2024-04-01 PROCEDURE — 93005 ELECTROCARDIOGRAM TRACING: CPT | Mod: 59

## 2024-04-01 PROCEDURE — 2500000004 HC RX 250 GENERAL PHARMACY W/ HCPCS (ALT 636 FOR OP/ED): Performed by: NURSE ANESTHETIST, CERTIFIED REGISTERED

## 2024-04-01 PROCEDURE — 93010 ELECTROCARDIOGRAM REPORT: CPT | Performed by: STUDENT IN AN ORGANIZED HEALTH CARE EDUCATION/TRAINING PROGRAM

## 2024-04-01 PROCEDURE — 3700000002 HC GENERAL ANESTHESIA TIME - EACH INCREMENTAL 1 MINUTE

## 2024-04-01 PROCEDURE — 93318 ECHO TRANSESOPHAGEAL INTRAOP: CPT | Performed by: INTERNAL MEDICINE

## 2024-04-01 RX ORDER — ACETAMINOPHEN 325 MG/1
650 TABLET ORAL EVERY 6 HOURS
Status: CANCELLED | OUTPATIENT
Start: 2024-04-01

## 2024-04-01 RX ORDER — FAMOTIDINE 10 MG/ML
20 INJECTION INTRAVENOUS ONCE
Status: DISCONTINUED | OUTPATIENT
Start: 2024-04-01 | End: 2024-04-02 | Stop reason: HOSPADM

## 2024-04-01 RX ORDER — DIGOXIN 125 MCG
125 TABLET ORAL DAILY
Status: CANCELLED | OUTPATIENT
Start: 2024-04-01

## 2024-04-01 RX ORDER — ASPIRIN 81 MG/1
81 TABLET ORAL DAILY
Status: CANCELLED | OUTPATIENT
Start: 2024-04-01

## 2024-04-01 RX ORDER — PROPOFOL 10 MG/ML
INJECTION, EMULSION INTRAVENOUS AS NEEDED
Status: DISCONTINUED | OUTPATIENT
Start: 2024-04-01 | End: 2024-04-01

## 2024-04-01 RX ORDER — SODIUM CHLORIDE 9 MG/ML
50 INJECTION, SOLUTION INTRAVENOUS CONTINUOUS
Status: DISCONTINUED | OUTPATIENT
Start: 2024-04-01 | End: 2024-04-02 | Stop reason: HOSPADM

## 2024-04-01 RX ORDER — NITROGLYCERIN 0.4 MG/1
0.4 TABLET SUBLINGUAL EVERY 5 MIN PRN
Status: CANCELLED | OUTPATIENT
Start: 2024-04-01

## 2024-04-01 RX ORDER — METOPROLOL SUCCINATE 50 MG/1
100 TABLET, EXTENDED RELEASE ORAL DAILY
Status: CANCELLED | OUTPATIENT
Start: 2024-04-01

## 2024-04-01 RX ORDER — METOCLOPRAMIDE HYDROCHLORIDE 5 MG/ML
10 INJECTION INTRAMUSCULAR; INTRAVENOUS ONCE
Status: DISCONTINUED | OUTPATIENT
Start: 2024-04-01 | End: 2024-04-02 | Stop reason: HOSPADM

## 2024-04-01 RX ORDER — METFORMIN HYDROCHLORIDE 500 MG/1
500 TABLET ORAL 2 TIMES DAILY
Status: CANCELLED | OUTPATIENT
Start: 2024-04-01

## 2024-04-01 RX ORDER — FUROSEMIDE 40 MG/1
40 TABLET ORAL
Status: CANCELLED | OUTPATIENT
Start: 2024-04-01

## 2024-04-01 RX ADMIN — PROPOFOL 40 MG: 10 INJECTION, EMULSION INTRAVENOUS at 07:43

## 2024-04-01 RX ADMIN — PROPOFOL 50 MG: 10 INJECTION, EMULSION INTRAVENOUS at 07:42

## 2024-04-01 RX ADMIN — PROPOFOL 30 MG: 10 INJECTION, EMULSION INTRAVENOUS at 07:44

## 2024-04-01 RX ADMIN — PROPOFOL 20 MG: 10 INJECTION, EMULSION INTRAVENOUS at 07:47

## 2024-04-01 RX ADMIN — PROPOFOL 20 MG: 10 INJECTION, EMULSION INTRAVENOUS at 07:45

## 2024-04-01 RX ADMIN — PROPOFOL 20 MG: 10 INJECTION, EMULSION INTRAVENOUS at 07:53

## 2024-04-01 RX ADMIN — PROPOFOL 20 MG: 10 INJECTION, EMULSION INTRAVENOUS at 07:55

## 2024-04-01 RX ADMIN — PROPOFOL 20 MG: 10 INJECTION, EMULSION INTRAVENOUS at 07:49

## 2024-04-01 RX ADMIN — PROPOFOL 20 MG: 10 INJECTION, EMULSION INTRAVENOUS at 07:51

## 2024-04-01 SDOH — HEALTH STABILITY: MENTAL HEALTH: CURRENT SMOKER: 0

## 2024-04-01 ASSESSMENT — PAIN SCALES - GENERAL
PAINLEVEL_OUTOF10: 0 - NO PAIN
PAINLEVEL_OUTOF10: 0 - NO PAIN
PAIN_LEVEL: 0
PAINLEVEL_OUTOF10: 0 - NO PAIN

## 2024-04-01 ASSESSMENT — COLUMBIA-SUICIDE SEVERITY RATING SCALE - C-SSRS
2. HAVE YOU ACTUALLY HAD ANY THOUGHTS OF KILLING YOURSELF?: NO
1. IN THE PAST MONTH, HAVE YOU WISHED YOU WERE DEAD OR WISHED YOU COULD GO TO SLEEP AND NOT WAKE UP?: NO
6. HAVE YOU EVER DONE ANYTHING, STARTED TO DO ANYTHING, OR PREPARED TO DO ANYTHING TO END YOUR LIFE?: NO

## 2024-04-01 ASSESSMENT — PAIN - FUNCTIONAL ASSESSMENT: PAIN_FUNCTIONAL_ASSESSMENT: 0-10

## 2024-04-01 NOTE — POST-PROCEDURE NOTE
Physician Transition of Care Summary  Invasive Cardiovascular Lab    Procedure Date: 4/1/2024  Attending: * No surgeons found in log *  Resident/Fellow/Other Assistant: * No surgeons found in log *    Indications:   * No Diagnosis Codes entered *    Post-procedure diagnosis:   * No Diagnosis Codes entered *    Procedure(s):   * No procedures documented on diagnosis form *      Procedure Findings:   Atrial Fibrillation    Description of the Procedure:   Cardioversion    Complications:   Nomne    Stents/Implants:       Anticoagulation/Antiplatelet Plan:   Eliquis    Estimated Blood Loss:   * No values recorded between 4/1/2024  7:42 AM and 4/1/2024  8:43 AM *    Anesthesia: * No anesthesia type entered * Anesthesia Staff: CRNA: SANTOS Zepeda    Any Specimen(s) Removed:   No specimens collected during this procedure.    Disposition:   Home      Electronically signed by: Ran Coker MD, 4/1/2024 8:43 AM

## 2024-04-01 NOTE — ANESTHESIA POSTPROCEDURE EVALUATION
Patient: Jatin Rodgers    Procedure Summary       Date: 04/01/24 Room / Location: [facility]    Anesthesia Start: 0730 Anesthesia Stop: 0805    Procedure: TRANSESOPHAGEAL ECHO (ZHANNA) W/ POSSIBLE CARDIOVERSION Diagnosis:       Paroxysmal atrial fibrillation (CMS/HCC)      Atrial fibrillation, unspecified type (CMS/HCC)      (Atrial fibrillation)    Scheduled Providers: Ran Coker MD Responsible Provider: SANTOS Zepeda    Anesthesia Type: MAC ASA Status: 3            Anesthesia Type: MAC    Vitals Value Taken Time   BP 93/55 04/01/24 0808   Temp 98.9 04/01/24 0808   Pulse 73 04/01/24 0805   Resp 26 04/01/24 0805   SpO2 100 % 04/01/24 0805       Anesthesia Post Evaluation    Patient location during evaluation: bedside  Patient participation: complete - patient participated  Level of consciousness: awake and alert  Pain score: 0  Pain management: adequate  Airway patency: patent  Cardiovascular status: acceptable  Respiratory status: acceptable  Hydration status: acceptable  Postoperative Nausea and Vomiting: none  Comments: Tolerated procedure well        There were no known notable events for this encounter.

## 2024-04-01 NOTE — NURSING NOTE
Discharge info given to pt and daughter, instructed to call cardio office for follow up information/EKG. Pt and daughter verbalized understanding. Pt remains in NSR with stable vital signs. No questions or concerns at this time. Pt wheeled out in stable condition.

## 2024-04-01 NOTE — ANESTHESIA PREPROCEDURE EVALUATION
Patient: Jatin Rodgers    Procedure Information       Date/Time: 04/01/24 0730    Scheduled providers: Ran Coker MD    Procedure: TRANSESOPHAGEAL ECHO (ZHANNA) W/ POSSIBLE CARDIOVERSION    Location: [facility]            Relevant Problems   Anesthesia (within normal limits)      Cardiac   (+) Atherosclerotic heart disease of native coronary artery without angina pectoris   (+) Atrial fibrillation (CMS/HCC)   (+) Atrial fibrillation, unspecified type (CMS/HCC)   (+) Chest pain   (+) Mixed hyperlipidemia   (+) Non-rheumatic mitral regurgitation   (+) Nonrheumatic tricuspid (valve) insufficiency      Pulmonary   (+) Obstructive sleep apnea   (+) Pulmonary hypertension (CMS/HCC)      Liver   (+) Acute cholecystitis   (+) Elevated liver function tests   (+) Fatty liver      Endocrine   (+) Diabetes mellitus, type 2 (CMS/HCC)   (+) Morbid (severe) obesity due to excess calories (CMS/HCC)      Hematology   (+) Chronic anticoagulation   (+) Polycythemia       Clinical information reviewed:   Tobacco  Allergies  Meds  Problems  Med Hx  Surg Hx   Fam Hx  Soc   Hx        NPO Detail:  NPO/Void Status  Date of Last Liquid: 03/31/24  Time of Last Liquid: 2200  Date of Last Solid: 03/31/24  Time of Last Solid: 2300  Last Intake Type: Light meal  Time of Last Void: 0600         Physical Exam    Airway  Mallampati: IV  TM distance: <3 FB  Neck ROM: limited     Cardiovascular   Rhythm: irregular  Rate: normal     Dental    Pulmonary   (+) decreased breath sounds     Abdominal   (+) obese             Anesthesia Plan    History of general anesthesia?: yes  History of complications of general anesthesia?: no    ASA 3     MAC     The patient is not a current smoker.  Education provided regarding risk of obstructive sleep apnea.  Anesthetic plan and risks discussed with patient.  Use of blood products discussed with patient who.    Plan discussed with attending.

## 2024-04-15 DIAGNOSIS — I50.42 CHRONIC COMBINED SYSTOLIC AND DIASTOLIC HEART FAILURE (MULTI): Primary | ICD-10-CM

## 2024-04-18 PROBLEM — K76.0 STEATOSIS OF LIVER: Status: ACTIVE | Noted: 2022-05-05

## 2024-05-16 ENCOUNTER — APPOINTMENT (OUTPATIENT)
Dept: CARDIOLOGY | Facility: CLINIC | Age: 74
End: 2024-05-16
Payer: COMMERCIAL

## 2024-06-12 NOTE — PROGRESS NOTES
Counseling:  The patient was counseled regarding diagnostic results, instructions for management, risk factor reductions, prognosis, patient and family education, impressions, risks and benefits of treatment options and importance of compliance with treatment.      Chief Complaint:  The patient presents today for 10-week followup of CAD and a-fib s/p DCC.     History Of Present Illness:    Jatin Rodgers is a 74 y.o. male patient who presents today for 10-week followup of CAD and a-fib s/p DCC. His PMH is significant for chronic combined systolic and diastolic heart failure, atrial fibrillation s/p DCC 04/01/2024, pulmonary HTN, DM type 2, umbilication hernia and JORDEN. On 04/01/2024, the patient underwent ZHANNA cardioversion resulting in sinus bradycardia. Today, the patient states that he is feeling well, denying any CP, chest discomfort or SOB. His only complaint is that sleep disturbance and daytime somnolence. He has a prior diagnosis of JORDEN, but was not initiated on treatment as he was having multiple other medical difficulties, per the patient's wife. Unfortunately, EKG today shows recurrent a-fib.      Past Surgical History:  He has a past surgical history that includes IR biliary cholangiogram (7/3/2023) and Cardiac electrophysiology procedure (N/A, 1/16/2024).      Social History:  He reports that he has never smoked. He has never used smokeless tobacco. He reports that he does not drink alcohol and does not use drugs.    Family History:  Family History   Problem Relation Name Age of Onset    No Known Problems Mother      Other (heart valve replaced) Father          Allergies:  Patient has no known allergies.    Outpatient Medications:  Current Outpatient Medications   Medication Instructions    acetaminophen (Tylenol) 325 mg tablet TAKE 2 TABLETS BY MOUTH EVERY 6 HOURS    aspirin 81 mg, oral, Daily    digoxin (LANOXIN) 125 mcg, oral, Daily    Eliquis 5 mg, oral, 2 times daily, PLEASE RESUME TONIGHT AS SCHEDULED  "1/17/24    empagliflozin (JARDIANCE) 10 mg, oral, Daily    furosemide (LASIX) 40 mg, oral, Daily RT    lancets (OneTouch Delica Plus Lancet) 30 gauge misc miscellaneous    metFORMIN (GLUCOPHAGE) 500 mg, oral, 2 times daily    metoprolol succinate XL (TOPROL XL) 100 mg, oral, Daily, Do not crush or chew.    nitroglycerin (NITROSTAT) 0.4 mg, sublingual, Every 5 min PRN    nitroglycerin (NITROSTAT) 0.4 mg, sublingual, Every 5 min PRN    OneTouch Verio test strips strip 2 times daily    pantoprazole (PROTONIX) 40 mg, oral, Daily, Do not crush, chew, or split.    sacubitriL-valsartan (Entresto) 49-51 mg tablet 1 tablet, oral, 2 times daily        Last Recorded Vitals:  Vitals:    06/13/24 1620   BP: 128/80   Pulse: 92   Weight: 120 kg (264 lb)   Height: 1.753 m (5' 9\")       Review of Systems   Respiratory:          Sleep disturbance with daytime somnolence    All other systems reviewed and are negative.     Physical Exam:  Constitutional:       Appearance: Healthy appearance. Not in distress.   Neck:      Vascular: No JVR. JVD normal.   Pulmonary:      Effort: Pulmonary effort is normal.      Breath sounds: Normal breath sounds. No wheezing. No rhonchi. No rales.   Chest:      Chest wall: Not tender to palpatation.   Cardiovascular:      PMI at left midclavicular line. Normal rate. Regular rhythm. Normal S1. Normal S2.       Murmurs: There is no murmur.      No gallop.  No click. No rub.   Pulses:     Intact distal pulses.   Edema:     Peripheral edema absent.   Abdominal:      General: Bowel sounds are normal.      Palpations: Abdomen is soft.      Tenderness: There is no abdominal tenderness.   Musculoskeletal: Normal range of motion.         General: No tenderness. Skin:     General: Skin is warm and dry.   Neurological:      General: No focal deficit present.      Mental Status: Alert and oriented to person, place and time.       Last Labs:  CBC -  Lab Results   Component Value Date    WBC 9.2 03/27/2024    HGB 17.2 " 03/27/2024    HCT 53.7 (H) 03/27/2024    MCV 92 03/27/2024     03/27/2024       CMP -  Lab Results   Component Value Date    CALCIUM 8.9 03/27/2024    PHOS 3.9 05/27/2023    PROT 6.3 (L) 03/27/2024    ALBUMIN 4.2 03/27/2024    AST 13 03/27/2024    ALT 14 03/27/2024    ALKPHOS 65 03/27/2024    BILITOT 0.7 03/27/2024       LIPID PANEL -   Lab Results   Component Value Date    CHOL 138 05/27/2023    TRIG 55 05/27/2023    HDL 61.2 05/27/2023    CHHDL 2.3 05/27/2023    LDLF 66 05/27/2023    VLDL 11 05/27/2023       RENAL FUNCTION PANEL -   Lab Results   Component Value Date    GLUCOSE 111 (H) 03/27/2024     03/27/2024    K 3.6 03/27/2024     03/27/2024    CO2 30 03/27/2024    ANIONGAP 10 03/27/2024    BUN 13 03/27/2024    CREATININE 0.94 03/27/2024    GFRMALE >90 08/01/2023    CALCIUM 8.9 03/27/2024    PHOS 3.9 05/27/2023    ALBUMIN 4.2 03/27/2024        Lab Results   Component Value Date    HGBA1C 6.2 10/27/2023       Last Cardiology Tests:  04/01/2024 - ZHANNA Cardioversion  1. Left ventricular systolic function is normal.  2. Successful direct current cardioversion for atrial fibrillation resulting in sinus bradycardia.  3. The left atrium is moderately dilated.    01/19/2024 - Electrophysiology Procedure  Successful pulmonary vein isolation for atrial fibrillation     05/30/2023 - Cardiac Catheterization (LH)  Non-obstructive coronary artery disease.     05/27/2023 - TTE  1. Left ventricular systolic function is low normal with a 45-50% estimated ejection fraction.  2. There is reduced right ventricular systolic function.  3. The patient is in atrial fibrillation which may influence the estimate of left ventricular function and transvalvular flows.  4. The aortic root is abnormal. There is mild dilatation of the ascending aorta (4.0 cm)     12/02/2020 - TTE  1. The patient was in atrial fibrillation during the study.  2. The left ventricle is mildly dilated. Global left ventricular systolic function  is severely reduced with an LVEF estimated at 16%. The segmented left ventricular dysfunction is as shown in the diagram.  3. The right ventricle is mildly dilated. Right ventricular systolic function is mild to moderately reduced.  4. There is mild mitral regurgitation.   5. There is moderate tricuspid regurgitation.  6. RVSP estimated at 32+20= 52 mmHg; consistent with moderate pulmonary HTN.  7. There is a 1.8 cm spherical echogenic mass at the LV apex.  8. The left atrium is moderately dilated.  9. The right atrium is moderately dilated.  10. Mild aortic root dilatation.  11. The IVC is dilated and does not significantly change with respiration.     Lab review: I have personally reviewed the laboratory result(s).  Diagnostic review: I have personally reviewed the result(s) of the ZHANNA Cardioversion.     Assessment/Plan   1) Atrial Fibrillation s/p PVI 01/16/2024, s/p DCC 04/01/2024  On Eliquis 5 mg BID, metoprolol succinate 100 mg daily, digoxin 125 mcg daily  Established with Dr. Sauer   S/P pulmonary vein isolation 01/16/2024  Patient subsequently reverted back to a-fib  Now s/p ZHANNA cardioversion 04/01/2024  EKG today shows recurrent a-fib  Message sent to Dr. Sauer - he recommends initiation of Tikosyn, and their office will contact the patient to schedule appropriate followup.  Check CBC, CMP, digoxin level   Continue current medical Rx  F/U after EP management     2) CAD  On ASA 81 mg daily, Entresto 49-51 mg BID, furosemide 40 mg daily, Jardiance 10 mg daily, metoprolol succinate 100 mg daily.  Ranexa previously discontinued  TTE 05/27/2023 with LVEF 45-50%, reduced RV systolic function, in atrial fibrillation, mild dilatation of ascending aorta (4.0 cm)   [facility] 05/30/2023 with non-obstructive CAD  ZHANNA 04/01/2024 with normal LV systolic function  Denies CP, chest discomfort or SOB  BP stable  Check CBC, CMP, Lipid Panel   Continue current medical Rx  F/U after EP management as above    3) JORDEN  Reports  sleep disturbance and daytime somnolence  Remote diagnosis of JORDEN, but treatment not initiated as he was having multiple other medical difficulties - per the patient's wife.  Referral placed to Dr. Khan, sleep medicine specialist       Scribe Attestation  By signing my name below, I, Yrn Whitney   attest that this documentation has been prepared under the direction and in the presence of Ran Coker MD.

## 2024-06-13 ENCOUNTER — APPOINTMENT (OUTPATIENT)
Dept: CARDIOLOGY | Facility: CLINIC | Age: 74
End: 2024-06-13
Payer: COMMERCIAL

## 2024-06-13 VITALS
SYSTOLIC BLOOD PRESSURE: 128 MMHG | HEART RATE: 92 BPM | DIASTOLIC BLOOD PRESSURE: 80 MMHG | WEIGHT: 264 LBS | HEIGHT: 69 IN | BODY MASS INDEX: 39.1 KG/M2

## 2024-06-13 DIAGNOSIS — I48.91 ATRIAL FIBRILLATION, UNSPECIFIED TYPE (MULTI): ICD-10-CM

## 2024-06-13 DIAGNOSIS — E78.2 MIXED HYPERLIPIDEMIA: ICD-10-CM

## 2024-06-13 DIAGNOSIS — I50.42 CHRONIC COMBINED SYSTOLIC AND DIASTOLIC HEART FAILURE (MULTI): ICD-10-CM

## 2024-06-13 DIAGNOSIS — I48.0 PAROXYSMAL ATRIAL FIBRILLATION (MULTI): ICD-10-CM

## 2024-06-13 PROCEDURE — 1159F MED LIST DOCD IN RCRD: CPT | Performed by: INTERNAL MEDICINE

## 2024-06-13 PROCEDURE — 3074F SYST BP LT 130 MM HG: CPT | Performed by: INTERNAL MEDICINE

## 2024-06-13 PROCEDURE — 1036F TOBACCO NON-USER: CPT | Performed by: INTERNAL MEDICINE

## 2024-06-13 PROCEDURE — 1160F RVW MEDS BY RX/DR IN RCRD: CPT | Performed by: INTERNAL MEDICINE

## 2024-06-13 PROCEDURE — 99213 OFFICE O/P EST LOW 20 MIN: CPT | Performed by: INTERNAL MEDICINE

## 2024-06-13 PROCEDURE — 3008F BODY MASS INDEX DOCD: CPT | Performed by: INTERNAL MEDICINE

## 2024-06-13 PROCEDURE — 93000 ELECTROCARDIOGRAM COMPLETE: CPT | Performed by: INTERNAL MEDICINE

## 2024-06-13 PROCEDURE — 3079F DIAST BP 80-89 MM HG: CPT | Performed by: INTERNAL MEDICINE

## 2024-06-13 RX ORDER — DIGOXIN 125 MCG
125 TABLET ORAL DAILY
Qty: 90 TABLET | Refills: 3 | Status: SHIPPED | OUTPATIENT
Start: 2024-06-13 | End: 2025-06-13

## 2024-06-13 RX ORDER — APIXABAN 5 MG/1
5 TABLET, FILM COATED ORAL 2 TIMES DAILY
Qty: 180 TABLET | Refills: 3 | Status: SHIPPED | OUTPATIENT
Start: 2024-06-13 | End: 2025-06-13

## 2024-06-13 RX ORDER — FUROSEMIDE 40 MG/1
40 TABLET ORAL
Qty: 90 TABLET | Refills: 3 | Status: SHIPPED | OUTPATIENT
Start: 2024-06-13 | End: 2025-06-13

## 2024-06-13 ASSESSMENT — ENCOUNTER SYMPTOMS
DEPRESSION: 0
OCCASIONAL FEELINGS OF UNSTEADINESS: 1
LOSS OF SENSATION IN FEET: 0

## 2024-06-13 NOTE — LETTER
June 13, 2024     Kunal Riley MD  [address] [facility] Medical Office Bldg  [place] [address]    Patient: Jatin Rodgers   YOB: 1950   Date of Visit: 6/13/2024       Dear Dr. Kunal Riley MD:    Thank you for referring Jatin Rodgers to me for evaluation. Below are my notes for this consultation.  If you have questions, please do not hesitate to call me. I look forward to following your patient along with you.       Sincerely,     Ran Coker MD      CC: No Recipients  ______________________________________________________________________________________    Counseling:  The patient was counseled regarding diagnostic results, instructions for management, risk factor reductions, prognosis, patient and family education, impressions, risks and benefits of treatment options and importance of compliance with treatment.      Chief Complaint:  The patient presents today for 10-week followup of CAD and a-fib s/p DCC.     History Of Present Illness:    Jatin Rodgers is a 74 y.o. male patient who presents today for 10-week followup of CAD and a-fib s/p DCC. His PMH is significant for chronic combined systolic and diastolic heart failure, atrial fibrillation s/p DCC 04/01/2024, pulmonary HTN, DM type 2, umbilication hernia and JORDEN. On 04/01/2024, the patient underwent ZHANNA cardioversion resulting in sinus bradycardia. Today, the patient states that he is feeling well, denying any CP, chest discomfort or SOB. His only complaint is that sleep disturbance and daytime somnolence. He has a prior diagnosis of JORDEN, but was not initiated on treatment as he was having multiple other medical difficulties, per the patient's wife. Unfortunately, EKG today shows recurrent a-fib.      Past Surgical History:  He has a past surgical history that includes IR biliary cholangiogram (7/3/2023) and Cardiac electrophysiology procedure (N/A, 1/16/2024).      Social  "History:  He reports that he has never smoked. He has never used smokeless tobacco. He reports that he does not drink alcohol and does not use drugs.    Family History:  Family History   Problem Relation Name Age of Onset   • No Known Problems Mother     • Other (heart valve replaced) Father          Allergies:  Patient has no known allergies.    Outpatient Medications:  Current Outpatient Medications   Medication Instructions   • acetaminophen (Tylenol) 325 mg tablet TAKE 2 TABLETS BY MOUTH EVERY 6 HOURS   • aspirin 81 mg, oral, Daily   • digoxin (LANOXIN) 125 mcg, oral, Daily   • Eliquis 5 mg, oral, 2 times daily, PLEASE RESUME TONIGHT AS SCHEDULED 1/17/24   • empagliflozin (JARDIANCE) 10 mg, oral, Daily   • furosemide (LASIX) 40 mg, oral, Daily RT   • lancets (OneTouch Delica Plus Lancet) 30 gauge misc miscellaneous   • metFORMIN (GLUCOPHAGE) 500 mg, oral, 2 times daily   • metoprolol succinate XL (TOPROL XL) 100 mg, oral, Daily, Do not crush or chew.   • nitroglycerin (NITROSTAT) 0.4 mg, sublingual, Every 5 min PRN   • nitroglycerin (NITROSTAT) 0.4 mg, sublingual, Every 5 min PRN   • OneTouch Verio test strips strip 2 times daily   • pantoprazole (PROTONIX) 40 mg, oral, Daily, Do not crush, chew, or split.   • sacubitriL-valsartan (Entresto) 49-51 mg tablet 1 tablet, oral, 2 times daily        Last Recorded Vitals:  Vitals:    06/13/24 1620   BP: 128/80   Pulse: 92   Weight: 120 kg (264 lb)   Height: 1.753 m (5' 9\")       Review of Systems   Respiratory:          Sleep disturbance with daytime somnolence    All other systems reviewed and are negative.     Physical Exam:  Constitutional:       Appearance: Healthy appearance. Not in distress.   Neck:      Vascular: No JVR. JVD normal.   Pulmonary:      Effort: Pulmonary effort is normal.      Breath sounds: Normal breath sounds. No wheezing. No rhonchi. No rales.   Chest:      Chest wall: Not tender to palpatation.   Cardiovascular:      PMI at left midclavicular " line. Normal rate. Regular rhythm. Normal S1. Normal S2.       Murmurs: There is no murmur.      No gallop.  No click. No rub.   Pulses:     Intact distal pulses.   Edema:     Peripheral edema absent.   Abdominal:      General: Bowel sounds are normal.      Palpations: Abdomen is soft.      Tenderness: There is no abdominal tenderness.   Musculoskeletal: Normal range of motion.         General: No tenderness. Skin:     General: Skin is warm and dry.   Neurological:      General: No focal deficit present.      Mental Status: Alert and oriented to person, place and time.       Last Labs:  CBC -  Lab Results   Component Value Date    WBC 9.2 03/27/2024    HGB 17.2 03/27/2024    HCT 53.7 (H) 03/27/2024    MCV 92 03/27/2024     03/27/2024       CMP -  Lab Results   Component Value Date    CALCIUM 8.9 03/27/2024    PHOS 3.9 05/27/2023    PROT 6.3 (L) 03/27/2024    ALBUMIN 4.2 03/27/2024    AST 13 03/27/2024    ALT 14 03/27/2024    ALKPHOS 65 03/27/2024    BILITOT 0.7 03/27/2024       LIPID PANEL -   Lab Results   Component Value Date    CHOL 138 05/27/2023    TRIG 55 05/27/2023    HDL 61.2 05/27/2023    CHHDL 2.3 05/27/2023    LDLF 66 05/27/2023    VLDL 11 05/27/2023       RENAL FUNCTION PANEL -   Lab Results   Component Value Date    GLUCOSE 111 (H) 03/27/2024     03/27/2024    K 3.6 03/27/2024     03/27/2024    CO2 30 03/27/2024    ANIONGAP 10 03/27/2024    BUN 13 03/27/2024    CREATININE 0.94 03/27/2024    GFRMALE >90 08/01/2023    CALCIUM 8.9 03/27/2024    PHOS 3.9 05/27/2023    ALBUMIN 4.2 03/27/2024        Lab Results   Component Value Date    HGBA1C 6.2 10/27/2023       Last Cardiology Tests:  04/01/2024 - ZHANNA Cardioversion  1. Left ventricular systolic function is normal.  2. Successful direct current cardioversion for atrial fibrillation resulting in sinus bradycardia.  3. The left atrium is moderately dilated.    01/19/2024 - Electrophysiology Procedure  Successful pulmonary vein isolation for  atrial fibrillation     05/30/2023 - Cardiac Catheterization (LH)  Non-obstructive coronary artery disease.     05/27/2023 - TTE  1. Left ventricular systolic function is low normal with a 45-50% estimated ejection fraction.  2. There is reduced right ventricular systolic function.  3. The patient is in atrial fibrillation which may influence the estimate of left ventricular function and transvalvular flows.  4. The aortic root is abnormal. There is mild dilatation of the ascending aorta (4.0 cm)     12/02/2020 - TTE  1. The patient was in atrial fibrillation during the study.  2. The left ventricle is mildly dilated. Global left ventricular systolic function is severely reduced with an LVEF estimated at 16%. The segmented left ventricular dysfunction is as shown in the diagram.  3. The right ventricle is mildly dilated. Right ventricular systolic function is mild to moderately reduced.  4. There is mild mitral regurgitation.   5. There is moderate tricuspid regurgitation.  6. RVSP estimated at 32+20= 52 mmHg; consistent with moderate pulmonary HTN.  7. There is a 1.8 cm spherical echogenic mass at the LV apex.  8. The left atrium is moderately dilated.  9. The right atrium is moderately dilated.  10. Mild aortic root dilatation.  11. The IVC is dilated and does not significantly change with respiration.     Lab review: I have personally reviewed the laboratory result(s).  Diagnostic review: I have personally reviewed the result(s) of the ZHANNA Cardioversion.     Assessment/Plan  1) Atrial Fibrillation s/p PVI 01/16/2024, s/p DCC 04/01/2024  On Eliquis 5 mg BID, metoprolol succinate 100 mg daily, digoxin 125 mcg daily  Established with Dr. Sauer   S/P pulmonary vein isolation 01/16/2024  Patient subsequently reverted back to a-fib  Now s/p ZHANNA cardioversion 04/01/2024  EKG today shows recurrent a-fib  Message sent to Dr. Sauer - he recommends initiation of Tikosyn, and their office will contact the patient to  schedule appropriate followup.  Check CBC, CMP, digoxin level   Continue current medical Rx  F/U after EP management     2) CAD  On ASA 81 mg daily, Entresto 49-51 mg BID, furosemide 40 mg daily, Jardiance 10 mg daily, metoprolol succinate 100 mg daily.  Ranexa previously discontinued  TTE 05/27/2023 with LVEF 45-50%, reduced RV systolic function, in atrial fibrillation, mild dilatation of ascending aorta (4.0 cm)   LHC 05/30/2023 with non-obstructive CAD  ZHANNA 04/01/2024 with normal LV systolic function  Denies CP, chest discomfort or SOB  BP stable  Check CBC, CMP, Lipid Panel   Continue current medical Rx  F/U after EP management as above    3) JORDEN  Reports sleep disturbance and daytime somnolence  Remote diagnosis of JORDEN, but treatment not initiated as he was having multiple other medical difficulties - per the patient's wife.  Referral placed to Dr. Khan, sleep medicine specialist       Scribe Attestation  By signing my name below, I, Jesusita Edwards, Scribe   attest that this documentation has been prepared under the direction and in the presence of Ran Coker MD.

## 2024-06-13 NOTE — PATIENT INSTRUCTIONS
Continue all current medications as prescribed.   As you are back in atrial fibrillation, Dr. Sauer's office will contact you for followup to discuss further treatment/management. Dr. Sauer has recommended starting Tikosyn, which needs to be done as an inpatient.   Please have blood work drawn at your earliest convenience.   Dr. Coker has placed a referral to Dr. Khan, sleep medicine specialist, for evaluation of sleep apnea.  Followup with Dr. Coker after you have been seen and treated by Dr. Sauer.     If you have any questions or cardiac concerns, please call our office at [phone].

## 2024-07-23 RX ORDER — RANOLAZINE 500 MG/1
500 TABLET, EXTENDED RELEASE ORAL 2 TIMES DAILY
COMMUNITY
Start: 2024-05-02

## 2024-09-13 ENCOUNTER — APPOINTMENT (OUTPATIENT)
Dept: CARDIOLOGY | Facility: CLINIC | Age: 74
End: 2024-09-13
Payer: COMMERCIAL

## 2024-09-15 NOTE — PROGRESS NOTES
Counseling:  The patient was counseled regarding diagnostic results, instructions for management, risk factor reductions, prognosis, patient and family education, impressions, risks and benefits of treatment options and importance of compliance with treatment.      Chief Complaint:  The patient presents today for 3-month followup of CAD and a-fib.     History Of Present Illness:    Jatin Rodgers is a 74 y.o. male patient who presents today for 3-month followup of CAD and a-fib. His PMH is significant for chronic combined systolic and diastolic heart failure, atrial fibrillation s/p DCC 04/01/2024, pulmonary HTN, DM type 2, umbilication hernia and JORDEN. Over the past 3 months, the patient states that he has done well from a cardiac standpoint. He denies any CP, chest discomfort or SOB. Unfortunately, Dr. Sauer's office has not contacted them for Tikosyn initiation. He has also not been seen by sleep medicine as of yet for JORDEN workup. EKG today shows atrial fibrillation with controlled ventricular response. BP has been stable. The patient is compliant with his prescribed medications.     Past Surgical History:  He has a past surgical history that includes IR biliary cholangiogram (7/3/2023) and Cardiac electrophysiology procedure (N/A, 1/16/2024).      Social History:  He reports that he has never smoked. He has never used smokeless tobacco. He reports that he does not drink alcohol and does not use drugs.    Family History:  Family History   Problem Relation Name Age of Onset    No Known Problems Mother      Other (heart valve replaced) Father          Allergies:  Patient has no known allergies.    Outpatient Medications:  Current Outpatient Medications   Medication Instructions    aspirin 81 mg, oral, Daily    digoxin (LANOXIN) 125 mcg, oral, Daily    Eliquis 5 mg, oral, 2 times daily, PLEASE RESUME TONIGHT AS SCHEDULED 1/17/24    empagliflozin (JARDIANCE) 10 mg, oral, Daily    furosemide (LASIX) 40 mg, oral, Daily  "RT    lancets (OneTouch Delica Plus Lancet) 30 gauge misc miscellaneous    metFORMIN (GLUCOPHAGE) 500 mg, oral, 2 times daily    metoprolol succinate XL (TOPROL XL) 100 mg, oral, Daily, Do not crush or chew.    nitroglycerin (NITROSTAT) 0.4 mg, sublingual, Every 5 min PRN    nitroglycerin (NITROSTAT) 0.4 mg, sublingual, Every 5 min PRN    OneTouch Verio test strips strip 2 times daily    pantoprazole (PROTONIX) 40 mg, oral, Daily, Do not crush, chew, or split.    ranolazine (RANEXA) 500 mg, oral, 2 times daily    sacubitriL-valsartan (Entresto) 49-51 mg tablet 1 tablet, oral, 2 times daily        Last Recorded Vitals:  Vitals:    09/16/24 1119   BP: 128/80   BP Location: Left arm   Pulse: 87   Weight: 117 kg (259 lb)   Height: 1.753 m (5' 9\")         Review of Systems   All other systems reviewed and are negative.     Physical Exam:  Constitutional:       Appearance: Healthy appearance. Not in distress.   Neck:      Vascular: No JVR. JVD normal.   Pulmonary:      Effort: Pulmonary effort is normal.      Breath sounds: Normal breath sounds. No wheezing. No rhonchi. No rales.   Chest:      Chest wall: Not tender to palpatation.   Cardiovascular:      PMI at left midclavicular line. Normal rate. Regular rhythm. Normal S1. Normal S2.       Murmurs: There is no murmur.      No gallop.  No click. No rub.   Pulses:     Intact distal pulses.   Edema:     Peripheral edema absent.   Abdominal:      General: Bowel sounds are normal.      Palpations: Abdomen is soft.      Tenderness: There is no abdominal tenderness.   Musculoskeletal: Normal range of motion.         General: No tenderness. Skin:     General: Skin is warm and dry.   Neurological:      General: No focal deficit present.      Mental Status: Alert and oriented to person, place and time.       Last Labs:  CBC -  Lab Results   Component Value Date    WBC 9.2 03/27/2024    HGB 17.2 03/27/2024    HCT 53.7 (H) 03/27/2024    MCV 92 03/27/2024     03/27/2024 "       CMP -  Lab Results   Component Value Date    CALCIUM 8.9 03/27/2024    PHOS 3.9 05/27/2023    PROT 6.3 (L) 03/27/2024    ALBUMIN 4.2 03/27/2024    AST 13 03/27/2024    ALT 14 03/27/2024    ALKPHOS 65 03/27/2024    BILITOT 0.7 03/27/2024       LIPID PANEL -   Lab Results   Component Value Date    CHOL 138 05/27/2023    TRIG 55 05/27/2023    HDL 61.2 05/27/2023    CHHDL 2.3 05/27/2023    LDLF 66 05/27/2023    VLDL 11 05/27/2023       RENAL FUNCTION PANEL -   Lab Results   Component Value Date    GLUCOSE 111 (H) 03/27/2024     03/27/2024    K 3.6 03/27/2024     03/27/2024    CO2 30 03/27/2024    ANIONGAP 10 03/27/2024    BUN 13 03/27/2024    CREATININE 0.94 03/27/2024    GFRMALE >90 08/01/2023    CALCIUM 8.9 03/27/2024    PHOS 3.9 05/27/2023    ALBUMIN 4.2 03/27/2024        Lab Results   Component Value Date    HGBA1C 6.2 10/27/2023       Last Cardiology Tests:  04/01/2024 - ZHANNA Cardioversion  1. Left ventricular systolic function is normal.  2. Successful direct current cardioversion for atrial fibrillation resulting in sinus bradycardia.  3. The left atrium is moderately dilated.    01/19/2024 - Electrophysiology Procedure  Successful pulmonary vein isolation for atrial fibrillation     05/30/2023 - Cardiac Catheterization (LH)  Non-obstructive coronary artery disease.     05/27/2023 - TTE  1. Left ventricular systolic function is low normal with a 45-50% estimated ejection fraction.  2. There is reduced right ventricular systolic function.  3. The patient is in atrial fibrillation which may influence the estimate of left ventricular function and transvalvular flows.  4. The aortic root is abnormal. There is mild dilatation of the ascending aorta (4.0 cm)     12/02/2020 - TTE  1. The patient was in atrial fibrillation during the study.  2. The left ventricle is mildly dilated. Global left ventricular systolic function is severely reduced with an LVEF estimated at 16%. The segmented left ventricular  dysfunction is as shown in the diagram.  3. The right ventricle is mildly dilated. Right ventricular systolic function is mild to moderately reduced.  4. There is mild mitral regurgitation.   5. There is moderate tricuspid regurgitation.  6. RVSP estimated at 32+20= 52 mmHg; consistent with moderate pulmonary HTN.  7. There is a 1.8 cm spherical echogenic mass at the LV apex.  8. The left atrium is moderately dilated.  9. The right atrium is moderately dilated.  10. Mild aortic root dilatation.  11. The IVC is dilated and does not significantly change with respiration.     Lab review: I have personally reviewed the laboratory result(s).    Assessment/Plan   1) Atrial Fibrillation s/p PVI 01/16/2024, s/p DCC 04/01/2024  On Eliquis 5 mg BID, metoprolol succinate 100 mg daily, digoxin 125 mcg daily  Established with Dr. Sauer   S/P pulmonary vein isolation 01/16/2024  Patient subsequently reverted back to a-fib  Now s/p ZHANNA cardioversion 04/01/2024  Message sent to Dr. Sauer 06/13/2024 s/t recurrent a-fib - he recommends initiation of Tikosyn, and their office will contact the patient to schedule appropriate followup.  Unfortunately, EP has not contacted the patient for Tikosyn initiation  EKG today shows a-fib with controlled ventricular response  New message sent to EP - patient advised to contact our office if he does not receive a call by Friday, 09/20/2024  Continue current medical Rx   Followup with Mary Hess NP, in 3 months      2) CAD  On ASA 81 mg daily, Entresto 49-51 mg BID, furosemide 40 mg daily, Jardiance 10 mg daily, metoprolol succinate 100 mg daily.  Ranexa previously discontinued  TTE 05/27/2023 with LVEF 45-50%, reduced RV systolic function, in atrial fibrillation, mild dilatation of ascending aorta (4.0 cm)   LHC 05/30/2023 with non-obstructive CAD  ZHANNA 04/01/2024 with normal LV systolic function  Denies CP, chest discomfort or SOB  BP stable  Advised to have labs drawn as previously  ordered   Continue current medical Rx   Followup with Mary Hess NP, in 3 months      3) JORDEN  Reports sleep disturbance and daytime somnolence  Remote diagnosis of JORDEN, but treatment not initiated as he was having multiple other medical difficulties - per the patient's wife.  Referral previously placed to Dr. Khan, sleep medicine specialist   Per the patient's wife, he has not been contacted for JORDEN workup as of yet      Scribe Attestation  By signing my name below, I, Jesusita Edwards Scribe   attest that this documentation has been prepared under the direction and in the presence of Ran Coker MD.

## 2024-09-16 ENCOUNTER — APPOINTMENT (OUTPATIENT)
Dept: CARDIOLOGY | Facility: CLINIC | Age: 74
End: 2024-09-16
Payer: COMMERCIAL

## 2024-09-16 ENCOUNTER — LAB (OUTPATIENT)
Dept: LAB | Facility: LAB | Age: 74
End: 2024-09-16
Payer: COMMERCIAL

## 2024-09-16 VITALS
SYSTOLIC BLOOD PRESSURE: 128 MMHG | HEART RATE: 87 BPM | BODY MASS INDEX: 38.36 KG/M2 | DIASTOLIC BLOOD PRESSURE: 80 MMHG | HEIGHT: 69 IN | WEIGHT: 259 LBS

## 2024-09-16 DIAGNOSIS — I48.91 ATRIAL FIBRILLATION, UNSPECIFIED TYPE (MULTI): ICD-10-CM

## 2024-09-16 DIAGNOSIS — I48.0 PAROXYSMAL ATRIAL FIBRILLATION (MULTI): ICD-10-CM

## 2024-09-16 DIAGNOSIS — I50.42 CHRONIC COMBINED SYSTOLIC AND DIASTOLIC HEART FAILURE: ICD-10-CM

## 2024-09-16 DIAGNOSIS — E78.2 MIXED HYPERLIPIDEMIA: ICD-10-CM

## 2024-09-16 LAB
ALBUMIN SERPL BCP-MCNC: 4.2 G/DL (ref 3.4–5)
ALP SERPL-CCNC: 62 U/L (ref 33–136)
ALT SERPL W P-5'-P-CCNC: 15 U/L (ref 10–52)
ANION GAP SERPL CALC-SCNC: 9 MMOL/L (ref 10–20)
AST SERPL W P-5'-P-CCNC: 16 U/L (ref 9–39)
BASOPHILS # BLD AUTO: 0.06 X10*3/UL (ref 0–0.1)
BASOPHILS NFR BLD AUTO: 0.7 %
BILIRUB SERPL-MCNC: 0.8 MG/DL (ref 0–1.2)
BUN SERPL-MCNC: 15 MG/DL (ref 6–23)
CALCIUM SERPL-MCNC: 8.8 MG/DL (ref 8.6–10.3)
CHLORIDE SERPL-SCNC: 107 MMOL/L (ref 98–107)
CHOLEST SERPL-MCNC: 123 MG/DL (ref 0–199)
CHOLESTEROL/HDL RATIO: 2.2
CO2 SERPL-SCNC: 29 MMOL/L (ref 21–32)
CREAT SERPL-MCNC: 0.93 MG/DL (ref 0.5–1.3)
DIGOXIN SERPL-MCNC: <0.3 NG/ML (ref 0.8–?)
EGFRCR SERPLBLD CKD-EPI 2021: 87 ML/MIN/1.73M*2
EOSINOPHIL # BLD AUTO: 0.18 X10*3/UL (ref 0–0.4)
EOSINOPHIL NFR BLD AUTO: 2.1 %
ERYTHROCYTE [DISTWIDTH] IN BLOOD BY AUTOMATED COUNT: 14.6 % (ref 11.5–14.5)
GLUCOSE SERPL-MCNC: 103 MG/DL (ref 74–99)
HCT VFR BLD AUTO: 53.3 % (ref 41–52)
HDLC SERPL-MCNC: 56.3 MG/DL
HGB BLD-MCNC: 16.7 G/DL (ref 13.5–17.5)
IMM GRANULOCYTES # BLD AUTO: 0.03 X10*3/UL (ref 0–0.5)
IMM GRANULOCYTES NFR BLD AUTO: 0.4 % (ref 0–0.9)
LDLC SERPL CALC-MCNC: 44 MG/DL
LYMPHOCYTES # BLD AUTO: 2.01 X10*3/UL (ref 0.8–3)
LYMPHOCYTES NFR BLD AUTO: 23.9 %
MCH RBC QN AUTO: 28.6 PG (ref 26–34)
MCHC RBC AUTO-ENTMCNC: 31.3 G/DL (ref 32–36)
MCV RBC AUTO: 91 FL (ref 80–100)
MONOCYTES # BLD AUTO: 0.9 X10*3/UL (ref 0.05–0.8)
MONOCYTES NFR BLD AUTO: 10.7 %
NEUTROPHILS # BLD AUTO: 5.24 X10*3/UL (ref 1.6–5.5)
NEUTROPHILS NFR BLD AUTO: 62.2 %
NON HDL CHOLESTEROL: 67 MG/DL (ref 0–149)
NRBC BLD-RTO: 0 /100 WBCS (ref 0–0)
PLATELET # BLD AUTO: 246 X10*3/UL (ref 150–450)
POTASSIUM SERPL-SCNC: 4.1 MMOL/L (ref 3.5–5.3)
PROT SERPL-MCNC: 6.5 G/DL (ref 6.4–8.2)
RBC # BLD AUTO: 5.84 X10*6/UL (ref 4.5–5.9)
SODIUM SERPL-SCNC: 141 MMOL/L (ref 136–145)
TRIGL SERPL-MCNC: 113 MG/DL (ref 0–149)
VLDL: 23 MG/DL (ref 0–40)
WBC # BLD AUTO: 8.4 X10*3/UL (ref 4.4–11.3)

## 2024-09-16 PROCEDURE — 3079F DIAST BP 80-89 MM HG: CPT | Performed by: INTERNAL MEDICINE

## 2024-09-16 PROCEDURE — 3074F SYST BP LT 130 MM HG: CPT | Performed by: INTERNAL MEDICINE

## 2024-09-16 PROCEDURE — 93010 ELECTROCARDIOGRAM REPORT: CPT | Performed by: INTERNAL MEDICINE

## 2024-09-16 PROCEDURE — 80061 LIPID PANEL: CPT

## 2024-09-16 PROCEDURE — 99213 OFFICE O/P EST LOW 20 MIN: CPT | Performed by: INTERNAL MEDICINE

## 2024-09-16 PROCEDURE — 80053 COMPREHEN METABOLIC PANEL: CPT

## 2024-09-16 PROCEDURE — 1159F MED LIST DOCD IN RCRD: CPT | Performed by: INTERNAL MEDICINE

## 2024-09-16 PROCEDURE — 36415 COLL VENOUS BLD VENIPUNCTURE: CPT

## 2024-09-16 PROCEDURE — 85025 COMPLETE CBC W/AUTO DIFF WBC: CPT

## 2024-09-16 PROCEDURE — 3008F BODY MASS INDEX DOCD: CPT | Performed by: INTERNAL MEDICINE

## 2024-09-16 PROCEDURE — 93005 ELECTROCARDIOGRAM TRACING: CPT | Performed by: INTERNAL MEDICINE

## 2024-09-16 PROCEDURE — 1160F RVW MEDS BY RX/DR IN RCRD: CPT | Performed by: INTERNAL MEDICINE

## 2024-09-16 PROCEDURE — 80162 ASSAY OF DIGOXIN TOTAL: CPT

## 2024-09-16 NOTE — PATIENT INSTRUCTIONS
Continue all current medications as prescribed.  Dr. Coker has sent a new message to Dr. Sauer's office. If you do not hear anything from Friday, please contact our office.  Please have your blood work drawn as previously ordered. You will be notified of the results once they become available.    Followup with Mary Hess NP, in 3 months.    If you have any questions or cardiac concerns, please call our office at [phone].

## 2024-09-20 ENCOUNTER — TELEPHONE (OUTPATIENT)
Dept: CARDIOLOGY | Facility: HOSPITAL | Age: 74
End: 2024-09-20
Payer: COMMERCIAL

## 2024-09-20 ENCOUNTER — HOSPITAL ENCOUNTER (INPATIENT)
Facility: HOSPITAL | Age: 74
End: 2024-09-20
Attending: INTERNAL MEDICINE | Admitting: INTERNAL MEDICINE
Payer: COMMERCIAL

## 2024-09-20 DIAGNOSIS — I48.91 ATRIAL FIBRILLATION, UNSPECIFIED TYPE (MULTI): Primary | ICD-10-CM

## 2024-09-20 NOTE — TELEPHONE ENCOUNTER
9/20 - Spoke with patient's daughter Valery regarding tikosyn initiation. Agreeable to Monday 9/30 [facility]. Explained the following instructions:    Patient may eat/drink on day of admission. Will need to keep phone close by as Bed Assignment will be calling most likely in the afternoon to relay what time to report to hospital. Report to registration on first floor - Main Entrance. Bring any necessities for 3 day stay in hospital - including medications in bottles or a perfect list of meds.     Patient's daughter verbalized understanding of all instructions. No questions at this time.

## 2024-09-27 DIAGNOSIS — I48.91 ATRIAL FIBRILLATION, UNSPECIFIED TYPE (MULTI): Primary | ICD-10-CM

## 2024-09-30 ENCOUNTER — HOSPITAL ENCOUNTER (INPATIENT)
Facility: HOSPITAL | Age: 74
LOS: 3 days | Discharge: HOME | End: 2024-10-03
Attending: INTERNAL MEDICINE | Admitting: INTERNAL MEDICINE
Payer: COMMERCIAL

## 2024-09-30 ENCOUNTER — APPOINTMENT (OUTPATIENT)
Dept: CARDIOLOGY | Facility: HOSPITAL | Age: 74
End: 2024-09-30
Payer: COMMERCIAL

## 2024-09-30 DIAGNOSIS — I47.20 VENTRICULAR TACHYCARDIA (MULTI): Primary | ICD-10-CM

## 2024-09-30 DIAGNOSIS — I48.19 PERSISTENT ATRIAL FIBRILLATION (MULTI): ICD-10-CM

## 2024-09-30 DIAGNOSIS — I48.91 ATRIAL FIBRILLATION, UNSPECIFIED TYPE (MULTI): ICD-10-CM

## 2024-09-30 DIAGNOSIS — I48.0 PAROXYSMAL ATRIAL FIBRILLATION (MULTI): ICD-10-CM

## 2024-09-30 LAB
ANION GAP SERPL CALC-SCNC: 12 MMOL/L (ref 10–20)
BUN SERPL-MCNC: 14 MG/DL (ref 6–23)
CALCIUM SERPL-MCNC: 8.6 MG/DL (ref 8.6–10.3)
CHLORIDE SERPL-SCNC: 104 MMOL/L (ref 98–107)
CO2 SERPL-SCNC: 26 MMOL/L (ref 21–32)
CREAT SERPL-MCNC: 0.86 MG/DL (ref 0.5–1.3)
EGFRCR SERPLBLD CKD-EPI 2021: >90 ML/MIN/1.73M*2
GLUCOSE BLD MANUAL STRIP-MCNC: 101 MG/DL (ref 74–99)
GLUCOSE BLD MANUAL STRIP-MCNC: 145 MG/DL (ref 74–99)
GLUCOSE SERPL-MCNC: 136 MG/DL (ref 74–99)
MAGNESIUM SERPL-MCNC: 1.89 MG/DL (ref 1.6–2.4)
MAGNESIUM SERPL-MCNC: 2.32 MG/DL (ref 1.6–2.4)
POTASSIUM SERPL-SCNC: 3.5 MMOL/L (ref 3.5–5.3)
POTASSIUM SERPL-SCNC: 3.8 MMOL/L (ref 3.5–5.3)
SODIUM SERPL-SCNC: 138 MMOL/L (ref 136–145)

## 2024-09-30 PROCEDURE — 2500000002 HC RX 250 W HCPCS SELF ADMINISTERED DRUGS (ALT 637 FOR MEDICARE OP, ALT 636 FOR OP/ED): Performed by: NURSE PRACTITIONER

## 2024-09-30 PROCEDURE — 84132 ASSAY OF SERUM POTASSIUM: CPT | Performed by: NURSE PRACTITIONER

## 2024-09-30 PROCEDURE — 36415 COLL VENOUS BLD VENIPUNCTURE: CPT | Performed by: NURSE PRACTITIONER

## 2024-09-30 PROCEDURE — 93010 ELECTROCARDIOGRAM REPORT: CPT | Performed by: INTERNAL MEDICINE

## 2024-09-30 PROCEDURE — 93005 ELECTROCARDIOGRAM TRACING: CPT

## 2024-09-30 PROCEDURE — 99222 1ST HOSP IP/OBS MODERATE 55: CPT | Performed by: INTERNAL MEDICINE

## 2024-09-30 PROCEDURE — 2500000001 HC RX 250 WO HCPCS SELF ADMINISTERED DRUGS (ALT 637 FOR MEDICARE OP): Performed by: NURSE PRACTITIONER

## 2024-09-30 PROCEDURE — 2500000004 HC RX 250 GENERAL PHARMACY W/ HCPCS (ALT 636 FOR OP/ED): Performed by: NURSE PRACTITIONER

## 2024-09-30 PROCEDURE — 82947 ASSAY GLUCOSE BLOOD QUANT: CPT

## 2024-09-30 PROCEDURE — 83735 ASSAY OF MAGNESIUM: CPT | Performed by: NURSE PRACTITIONER

## 2024-09-30 PROCEDURE — 82565 ASSAY OF CREATININE: CPT | Performed by: NURSE PRACTITIONER

## 2024-09-30 PROCEDURE — 1200000002 HC GENERAL ROOM WITH TELEMETRY DAILY

## 2024-09-30 RX ORDER — ASPIRIN 81 MG/1
81 TABLET ORAL DAILY
Status: DISCONTINUED | OUTPATIENT
Start: 2024-09-30 | End: 2024-10-03 | Stop reason: HOSPADM

## 2024-09-30 RX ORDER — MAGNESIUM SULFATE HEPTAHYDRATE 40 MG/ML
2 INJECTION, SOLUTION INTRAVENOUS ONCE
Status: COMPLETED | OUTPATIENT
Start: 2024-09-30 | End: 2024-09-30

## 2024-09-30 RX ORDER — FUROSEMIDE 40 MG/1
40 TABLET ORAL
Status: DISCONTINUED | OUTPATIENT
Start: 2024-09-30 | End: 2024-10-03 | Stop reason: HOSPADM

## 2024-09-30 RX ORDER — DOFETILIDE 0.5 MG/1
500 CAPSULE ORAL EVERY 12 HOURS SCHEDULED
Status: DISCONTINUED | OUTPATIENT
Start: 2024-10-01 | End: 2024-10-03 | Stop reason: HOSPADM

## 2024-09-30 RX ORDER — METOPROLOL SUCCINATE 50 MG/1
50 TABLET, EXTENDED RELEASE ORAL DAILY
Status: DISCONTINUED | OUTPATIENT
Start: 2024-09-30 | End: 2024-10-01

## 2024-09-30 RX ORDER — POTASSIUM CHLORIDE 1.5 G/1.58G
20 POWDER, FOR SOLUTION ORAL ONCE
Status: COMPLETED | OUTPATIENT
Start: 2024-09-30 | End: 2024-09-30

## 2024-09-30 RX ORDER — DEXTROSE 50 % IN WATER (D50W) INTRAVENOUS SYRINGE
25
Status: DISCONTINUED | OUTPATIENT
Start: 2024-09-30 | End: 2024-10-03 | Stop reason: HOSPADM

## 2024-09-30 RX ORDER — DEXTROSE 50 % IN WATER (D50W) INTRAVENOUS SYRINGE
12.5
Status: DISCONTINUED | OUTPATIENT
Start: 2024-09-30 | End: 2024-10-03 | Stop reason: HOSPADM

## 2024-09-30 RX ORDER — METFORMIN HYDROCHLORIDE 500 MG/1
500 TABLET ORAL 2 TIMES DAILY
Status: DISCONTINUED | OUTPATIENT
Start: 2024-09-30 | End: 2024-10-03 | Stop reason: HOSPADM

## 2024-09-30 RX ORDER — POTASSIUM CHLORIDE 20 MEQ/1
40 TABLET, EXTENDED RELEASE ORAL ONCE
Status: COMPLETED | OUTPATIENT
Start: 2024-09-30 | End: 2024-09-30

## 2024-09-30 RX ORDER — DOFETILIDE 0.5 MG/1
500 CAPSULE ORAL ONCE
Status: COMPLETED | OUTPATIENT
Start: 2024-09-30 | End: 2024-09-30

## 2024-09-30 SDOH — ECONOMIC STABILITY: HOUSING INSECURITY: IN THE PAST 12 MONTHS, HOW MANY TIMES HAVE YOU MOVED WHERE YOU WERE LIVING?: 1

## 2024-09-30 SDOH — SOCIAL STABILITY: SOCIAL INSECURITY: ARE THERE ANY APPARENT SIGNS OF INJURIES/BEHAVIORS THAT COULD BE RELATED TO ABUSE/NEGLECT?: NO

## 2024-09-30 SDOH — SOCIAL STABILITY: SOCIAL INSECURITY: WITHIN THE LAST YEAR, HAVE YOU BEEN AFRAID OF YOUR PARTNER OR EX-PARTNER?: NO

## 2024-09-30 SDOH — ECONOMIC STABILITY: INCOME INSECURITY: IN THE PAST 12 MONTHS, HAS THE ELECTRIC, GAS, OIL, OR WATER COMPANY THREATENED TO SHUT OFF SERVICE IN YOUR HOME?: NO

## 2024-09-30 SDOH — SOCIAL STABILITY: SOCIAL INSECURITY: DO YOU FEEL UNSAFE GOING BACK TO THE PLACE WHERE YOU ARE LIVING?: NO

## 2024-09-30 SDOH — SOCIAL STABILITY: SOCIAL INSECURITY: DOES ANYONE TRY TO KEEP YOU FROM HAVING/CONTACTING OTHER FRIENDS OR DOING THINGS OUTSIDE YOUR HOME?: NO

## 2024-09-30 SDOH — ECONOMIC STABILITY: FOOD INSECURITY: WITHIN THE PAST 12 MONTHS, THE FOOD YOU BOUGHT JUST DIDN'T LAST AND YOU DIDN'T HAVE MONEY TO GET MORE.: NEVER TRUE

## 2024-09-30 SDOH — ECONOMIC STABILITY: FOOD INSECURITY: WITHIN THE PAST 12 MONTHS, YOU WORRIED THAT YOUR FOOD WOULD RUN OUT BEFORE YOU GOT MONEY TO BUY MORE.: NEVER TRUE

## 2024-09-30 SDOH — ECONOMIC STABILITY: HOUSING INSECURITY: AT ANY TIME IN THE PAST 12 MONTHS, WERE YOU HOMELESS OR LIVING IN A SHELTER (INCLUDING NOW)?: NO

## 2024-09-30 SDOH — SOCIAL STABILITY: SOCIAL INSECURITY: ABUSE: ADULT

## 2024-09-30 SDOH — ECONOMIC STABILITY: INCOME INSECURITY: IN THE LAST 12 MONTHS, WAS THERE A TIME WHEN YOU WERE NOT ABLE TO PAY THE MORTGAGE OR RENT ON TIME?: NO

## 2024-09-30 SDOH — SOCIAL STABILITY: SOCIAL INSECURITY: DO YOU FEEL ANYONE HAS EXPLOITED OR TAKEN ADVANTAGE OF YOU FINANCIALLY OR OF YOUR PERSONAL PROPERTY?: NO

## 2024-09-30 SDOH — SOCIAL STABILITY: SOCIAL INSECURITY: HAS ANYONE EVER THREATENED TO HURT YOUR FAMILY OR YOUR PETS?: NO

## 2024-09-30 SDOH — SOCIAL STABILITY: SOCIAL INSECURITY: HAVE YOU HAD THOUGHTS OF HARMING ANYONE ELSE?: NO

## 2024-09-30 SDOH — SOCIAL STABILITY: SOCIAL INSECURITY: WITHIN THE LAST YEAR, HAVE YOU BEEN HUMILIATED OR EMOTIONALLY ABUSED IN OTHER WAYS BY YOUR PARTNER OR EX-PARTNER?: NO

## 2024-09-30 SDOH — SOCIAL STABILITY: SOCIAL INSECURITY: ARE YOU OR HAVE YOU BEEN THREATENED OR ABUSED PHYSICALLY, EMOTIONALLY, OR SEXUALLY BY ANYONE?: NO

## 2024-09-30 SDOH — SOCIAL STABILITY: SOCIAL INSECURITY: HAVE YOU HAD ANY THOUGHTS OF HARMING ANYONE ELSE?: NO

## 2024-09-30 SDOH — SOCIAL STABILITY: SOCIAL INSECURITY: WERE YOU ABLE TO COMPLETE ALL THE BEHAVIORAL HEALTH SCREENINGS?: YES

## 2024-09-30 SDOH — ECONOMIC STABILITY: INCOME INSECURITY: HOW HARD IS IT FOR YOU TO PAY FOR THE VERY BASICS LIKE FOOD, HOUSING, MEDICAL CARE, AND HEATING?: NOT HARD AT ALL

## 2024-09-30 ASSESSMENT — COGNITIVE AND FUNCTIONAL STATUS - GENERAL
DRESSING REGULAR LOWER BODY CLOTHING: A LITTLE
CLIMB 3 TO 5 STEPS WITH RAILING: A LITTLE
DAILY ACTIVITIY SCORE: 23
DAILY ACTIVITIY SCORE: 24
PATIENT BASELINE BEDBOUND: NO
MOBILITY SCORE: 23
MOBILITY SCORE: 24

## 2024-09-30 ASSESSMENT — ACTIVITIES OF DAILY LIVING (ADL)
DRESSING YOURSELF: INDEPENDENT
JUDGMENT_ADEQUATE_SAFELY_COMPLETE_DAILY_ACTIVITIES: YES
BATHING: NEEDS ASSISTANCE
GROOMING: INDEPENDENT
HEARING - RIGHT EAR: FUNCTIONAL
TOILETING: INDEPENDENT
ADEQUATE_TO_COMPLETE_ADL: YES
JUDGMENT_ADEQUATE_SAFELY_COMPLETE_DAILY_ACTIVITIES: YES
GROOMING: INDEPENDENT
WALKS IN HOME: INDEPENDENT
WALKS IN HOME: INDEPENDENT
DRESSING YOURSELF: INDEPENDENT
ADEQUATE_TO_COMPLETE_ADL: YES
FEEDING YOURSELF: INDEPENDENT
BATHING: NEEDS ASSISTANCE
PATIENT'S MEMORY ADEQUATE TO SAFELY COMPLETE DAILY ACTIVITIES?: YES
HEARING - LEFT EAR: FUNCTIONAL
ASSISTIVE_DEVICE: NONE;EYEGLASSES
TOILETING: INDEPENDENT
PATIENT'S MEMORY ADEQUATE TO SAFELY COMPLETE DAILY ACTIVITIES?: YES
FEEDING YOURSELF: INDEPENDENT

## 2024-09-30 ASSESSMENT — PAIN - FUNCTIONAL ASSESSMENT
PAIN_FUNCTIONAL_ASSESSMENT: 0-10

## 2024-09-30 ASSESSMENT — COLUMBIA-SUICIDE SEVERITY RATING SCALE - C-SSRS
1. IN THE PAST MONTH, HAVE YOU WISHED YOU WERE DEAD OR WISHED YOU COULD GO TO SLEEP AND NOT WAKE UP?: NO
2. HAVE YOU ACTUALLY HAD ANY THOUGHTS OF KILLING YOURSELF?: NO
6. HAVE YOU EVER DONE ANYTHING, STARTED TO DO ANYTHING, OR PREPARED TO DO ANYTHING TO END YOUR LIFE?: NO

## 2024-09-30 ASSESSMENT — LIFESTYLE VARIABLES
AUDIT-C TOTAL SCORE: 0
HOW OFTEN DO YOU HAVE A DRINK CONTAINING ALCOHOL: NEVER
HOW OFTEN DO YOU HAVE 6 OR MORE DRINKS ON ONE OCCASION: NEVER
HOW MANY STANDARD DRINKS CONTAINING ALCOHOL DO YOU HAVE ON A TYPICAL DAY: PATIENT DOES NOT DRINK
AUDIT-C TOTAL SCORE: 0
SKIP TO QUESTIONS 9-10: 1

## 2024-09-30 ASSESSMENT — PATIENT HEALTH QUESTIONNAIRE - PHQ9
1. LITTLE INTEREST OR PLEASURE IN DOING THINGS: NOT AT ALL
SUM OF ALL RESPONSES TO PHQ9 QUESTIONS 1 & 2: 0
2. FEELING DOWN, DEPRESSED OR HOPELESS: NOT AT ALL

## 2024-09-30 ASSESSMENT — PAIN SCALES - GENERAL
PAINLEVEL_OUTOF10: 0 - NO PAIN

## 2024-09-30 NOTE — CARE PLAN
"The patient's goals for the shift include      The clinical goals for the shift include \"Pt. will tolerate the initation of new medcation throughout shift\"      Problem: Pain - Adult  Goal: Verbalizes/displays adequate comfort level or baseline comfort level  Outcome: Progressing     Problem: Safety - Adult  Goal: Free from fall injury  Outcome: Progressing     Problem: Discharge Planning  Goal: Discharge to home or other facility with appropriate resources  Outcome: Progressing     Problem: Chronic Conditions and Co-morbidities  Goal: Patient's chronic conditions and co-morbidity symptoms are monitored and maintained or improved  Outcome: Progressing     "

## 2024-09-30 NOTE — H&P
History Of Present Illness:    Jatin Rodgers is a 74 y.o. male presenting with:    1. HFrEF: prior reduced LV function of 16%, thought to be tachy mediated. EF normalized in May 2021. Most recent LV function 45-50%.     2. 2. Atrial fibrillation: Patient has history of AF with RVR in December 2020. At that time his LV function was 16% with LV thrombus. He was eventually cardioverted and started on amiodarone. Repeat echocardiogram in May 2021 showed LV function normalized. At some point, patient was found to be in AF and amiodarone was discontinued and he was changed to rate control strategy. At the end of May 2023, patient presented to [facility] with diaphoresis and chest discomfort. He was transferred to [facility] and was found to be in AF RVR with hypotension. He had left heart catheterization which showed nonobstructive CAD. Echocardiogram showed LV function 45 to 50%. During hospitalization patient was found to have cholecystitis and had drain placed. He was rate controlled with metoprolol and digoxin. He had successful cholecystectomy.       We saw patient in October 2023. He was rate controlled at that time. AAD vs RFA was discussed and he was agreeable to ablation.     1/16/2024 successful PVI RFA    Unfortunately has had recurrent AF requiring frequent cardioversions. He comes in today for tikosyn initiation.       Last Recorded Vitals:  Vitals:    09/30/24 1533   BP: (!) 130/95   BP Location: Left arm   Patient Position: Sitting   Pulse: 85   Resp: 18   Temp: 36.2 °C (97.2 °F)   TempSrc: Temporal   SpO2: 96%       Last Labs:  CBC - 9/16/2024:  4:16 PM  8.4 16.7 246    53.3      CMP - 9/16/2024:  4:16 PM  8.8 6.5 16 --- 0.8   _ 4.2 15 62      PTT - No results in last year.  1.2   13.1 _     Troponin I   Date/Time Value Ref Range Status   05/27/2023 06:34 AM 6 0 - 20 ng/L Final     Comment:     .  Less than 99th percentile of normal range cutoff-  Female and children under 18 years old <14 ng/L; Male <21 ng/L:  Negative  Repeat testing should be performed if clinically indicated.   .  Female and children under 18 years old 14-50 ng/L; Male 21-50 ng/L:  Consistent with possible cardiac damage and possible increased clinical   risk. Serial measurements may help to assess extent of myocardial damage.   .  >50 ng/L: Consistent with cardiac damage, increased clinical risk and  myocardial infarction. Serial measurements may help assess extent of   myocardial damage.   .   NOTE: Children less than 1 year old may have higher baseline troponin   levels and results should be interpreted in conjunction with the overall   clinical context.   .  NOTE: Troponin I testing is performed using a different   testing methodology at [facility] than at other   [facility]. Direct result comparisons should only   be made within the same method.       Hemoglobin A1C   Date/Time Value Ref Range Status   05/27/2023 06:34 AM 6.4 (A) % Final     Comment:          Diagnosis of Diabetes-Adults   Non-Diabetic: < or = 5.6%   Increased risk for developing diabetes: 5.7-6.4%   Diagnostic of diabetes: > or = 6.5%  .       Monitoring of Diabetes                Age (y)     Therapeutic Goal (%)   Adults:          >18           <7.0   Pediatrics:    13-18           <7.5                   7-12           <8.0                   0- 6            7.5-8.5   American Diabetes Association. Diabetes Care 33(S1), Jan 2010.       POCT Hemoglobin A1C   Date/Time Value Ref Range Status   10/27/2023 10:41 AM 6.2 4.2 - 5.6 % Final     Comment:     Location:[facility], [address] [place] [address], [place], [address]  Point of care (POC) Hemoglobin A1c (HGBA1C) testing is intended to assess  glucose control and provide a management tool for patients known to have  diabetes and their healthcare providers.  Target HGBA1C levels may depend on  specific clinical circumstances.  POC HGBA1C is not intended for use as a  diagnostic or screening test; laboratory-based  testing should be used for  diagnostic purposes.  The following information is supplemental and may not  be applicable to specific diabetes management situations:  The POC device   provides a normal range of 4.2% to 6.5% for the HGBA1C POC test.  However, the American Diabetes Association  guidelines indicate that  patients with HGBA1C in the range of 5.7% to 6.4% are at increased risk for  development of diabetes and that intervention by lifestyle modification may  be beneficial.  A HGBA1C level greater than or equal to 6.5% is considered  diagnostic of diabetes, pending confirmatory testing.  Use of HGBA1C testing  to evaluate glucose control may not be appropriate for patients with  hemoglobin variants or other conditions (e.g. anemia) that alter red blood  cell lifespan.   03/15/2023 02:07 PM 6.5 4.2 - 5.6 % Final     Comment:     Location:[facility], [address] [place] [address] [place], [place], [address]  Point of care (POC) Hemoglobin A1c (HGBA1C) testing is intended to assess  glucose control and provide a management tool for patients known to have  diabetes and their healthcare providers.  Target HGBA1C levels may depend on  specific clinical circumstances.  POC HGBA1C is not intended for use as a  diagnostic or screening test; laboratory-based testing should be used for  diagnostic purposes.  The following information is supplemental and may not  be applicable to specific diabetes management situations:  The POC device   provides a normal range of 4.2% to 6.5% for the HGBA1C POC test.  However, the American Diabetes Association  guidelines indicate that  patients with HGBA1C in the range of 5.7% to 6.4% are at increased risk for  development of diabetes and that intervention by lifestyle modification may  be beneficial.  A HGBA1C level greater than or equal to 6.5% is considered  diagnostic of diabetes, pending confirmatory testing.  Use of HGBA1C testing  to evaluate glucose control may not  "be appropriate for patients with  hemoglobin variants or other conditions (e.g. anemia) that alter red blood  cell lifespan.     LDL Calculated   Date/Time Value Ref Range Status   09/16/2024 04:16 PM 44 <=99 mg/dL Final     Comment:                                 Near   Borderline      AGE      Desirable  Optimal    High     High     Very High     0-19 Y     0 - 109     ---    110-129   >/= 130     ----    20-24 Y     0 - 119     ---    120-159   >/= 160     ----      >24 Y     0 -  99   100-129  130-159   160-189     >/=190       VLDL   Date/Time Value Ref Range Status   09/16/2024 04:16 PM 23 0 - 40 mg/dL Final   05/27/2023 06:34 AM 11 0 - 40 mg/dL Final      Last I/O:  No intake/output data recorded.    Past Cardiology Tests (Last 3 Years):  EKG:  ECG 12 Lead 09/16/2024      ECG 12 Lead 06/13/2024      ECG 12 Lead 04/01/2024      ECG 12 lead (Clinic Performed) 03/25/2024      ECG 12 Lead       ECG 12 Lead       ECG 12 lead (Clinic Performed) 03/01/2024      ECG 12 lead (Clinic Performed) 10/26/2023    Echo:  Transesophageal Echo (ZHANNA) With Possible Cardioversion 04/01/2024    Ejection Fractions:  No results found for: \"EF\"  Cath:  No results found for this or any previous visit from the past 1095 days.    Stress Test:  No results found for this or any previous visit from the past 1095 days.    Cardiac Imaging:  No results found for this or any previous visit from the past 1095 days.      Past Medical History:  He has no past medical history on file.    Past Surgical History:  He has a past surgical history that includes IR biliary cholangiogram (7/3/2023) and Cardiac electrophysiology procedure (N/A, 1/16/2024).      Social History:  He reports that he has never smoked. He has never used smokeless tobacco. He reports that he does not drink alcohol and does not use drugs.    Family History:  Family History   Problem Relation Name Age of Onset    No Known Problems Mother      Other (heart valve replaced) Father   "        Allergies:  Patient has no known allergies.    Inpatient Medications:  Scheduled medications   Medication Dose Route Frequency    apixaban  5 mg oral BID    aspirin  81 mg oral Daily    dofetilide  500 mcg oral Once    [START ON 10/1/2024] dofetilide  500 mcg oral q12h KASEY    empagliflozin  10 mg oral Daily    furosemide  40 mg oral Daily    metFORMIN  500 mg oral BID    metoprolol succinate XL  50 mg oral Daily    sacubitriL-valsartan  1 tablet oral BID     PRN medications   Medication    dextrose    dextrose    glucagon    glucagon     Continuous Medications   Medication Dose Last Rate     Outpatient Medications:  Current Outpatient Medications   Medication Instructions    aspirin 81 mg, oral, Daily    digoxin (LANOXIN) 125 mcg, oral, Daily    Eliquis 5 mg, oral, 2 times daily, PLEASE RESUME TONIGHT AS SCHEDULED 1/17/24    empagliflozin (JARDIANCE) 10 mg, oral, Daily    furosemide (LASIX) 40 mg, oral, Daily RT    lancets (OneTouch Delica Plus Lancet) 30 gauge misc miscellaneous    metFORMIN (GLUCOPHAGE) 500 mg, oral, 2 times daily    metoprolol succinate XL (TOPROL XL) 100 mg, oral, Daily, Do not crush or chew.    nitroglycerin (NITROSTAT) 0.4 mg, sublingual, Every 5 min PRN    nitroglycerin (NITROSTAT) 0.4 mg, sublingual, Every 5 min PRN    OneTouch Verio test strips strip 2 times daily    pantoprazole (PROTONIX) 40 mg, oral, Daily, Do not crush, chew, or split.    ranolazine (RANEXA) 500 mg, oral, 2 times daily    sacubitriL-valsartan (Entresto) 49-51 mg tablet 1 tablet, oral, 2 times daily       Physical Exam:  Gen: AAOx3  Heart: IRR  Ext: no edema       Assessment/Plan     Persistent AF admit for tikosyn initiation.     Daily BMP/Mg, keep K>4, Mg>2, monitor renal function  ECG now and 2 hours after each tikosyn dose  Reduce metoprolol, discontinue digoxin/ranexa  Possible DCCV Wednesday if no spontaneous conversion       Code Status:  Full Code    I spent 30 minutes in the professional and overall care  of this patient.        Aaron Gómez MD

## 2024-10-01 ENCOUNTER — APPOINTMENT (OUTPATIENT)
Dept: CARDIOLOGY | Facility: HOSPITAL | Age: 74
End: 2024-10-01
Payer: COMMERCIAL

## 2024-10-01 LAB
ANION GAP SERPL CALC-SCNC: 11 MMOL/L (ref 10–20)
ATRIAL RATE: 131 BPM
ATRIAL RATE: 153 BPM
ATRIAL RATE: 163 BPM
BUN SERPL-MCNC: 12 MG/DL (ref 6–23)
CALCIUM SERPL-MCNC: 8.4 MG/DL (ref 8.6–10.3)
CHLORIDE SERPL-SCNC: 104 MMOL/L (ref 98–107)
CO2 SERPL-SCNC: 25 MMOL/L (ref 21–32)
CREAT SERPL-MCNC: 0.81 MG/DL (ref 0.5–1.3)
EGFRCR SERPLBLD CKD-EPI 2021: >90 ML/MIN/1.73M*2
GLUCOSE BLD MANUAL STRIP-MCNC: 101 MG/DL (ref 74–99)
GLUCOSE BLD MANUAL STRIP-MCNC: 117 MG/DL (ref 74–99)
GLUCOSE BLD MANUAL STRIP-MCNC: 121 MG/DL (ref 74–99)
GLUCOSE BLD MANUAL STRIP-MCNC: 175 MG/DL (ref 74–99)
GLUCOSE SERPL-MCNC: 142 MG/DL (ref 74–99)
MAGNESIUM SERPL-MCNC: 2.16 MG/DL (ref 1.6–2.4)
POTASSIUM SERPL-SCNC: 3.6 MMOL/L (ref 3.5–5.3)
Q ONSET: 207 MS
Q ONSET: 209 MS
Q ONSET: 210 MS
QRS COUNT: 15 BEATS
QRS COUNT: 16 BEATS
QRS COUNT: 16 BEATS
QRS DURATION: 100 MS
QRS DURATION: 90 MS
QRS DURATION: 94 MS
QT INTERVAL: 288 MS
QT INTERVAL: 298 MS
QT INTERVAL: 368 MS
QTC CALCULATION(BAZETT): 367 MS
QTC CALCULATION(BAZETT): 370 MS
QTC CALCULATION(BAZETT): 447 MS
QTC FREDERICIA: 339 MS
QTC FREDERICIA: 345 MS
QTC FREDERICIA: 419 MS
R AXIS: -42 DEGREES
R AXIS: -45 DEGREES
R AXIS: -48 DEGREES
SODIUM SERPL-SCNC: 136 MMOL/L (ref 136–145)
T AXIS: 131 DEGREES
T AXIS: 139 DEGREES
T AXIS: 175 DEGREES
T OFFSET: 353 MS
T OFFSET: 359 MS
T OFFSET: 391 MS
VENTRICULAR RATE: 89 BPM
VENTRICULAR RATE: 93 BPM
VENTRICULAR RATE: 98 BPM

## 2024-10-01 PROCEDURE — 83735 ASSAY OF MAGNESIUM: CPT | Performed by: PHYSICIAN ASSISTANT

## 2024-10-01 PROCEDURE — 82947 ASSAY GLUCOSE BLOOD QUANT: CPT

## 2024-10-01 PROCEDURE — 80048 BASIC METABOLIC PNL TOTAL CA: CPT | Performed by: PHYSICIAN ASSISTANT

## 2024-10-01 PROCEDURE — 36415 COLL VENOUS BLD VENIPUNCTURE: CPT | Performed by: PHYSICIAN ASSISTANT

## 2024-10-01 PROCEDURE — 2500000002 HC RX 250 W HCPCS SELF ADMINISTERED DRUGS (ALT 637 FOR MEDICARE OP, ALT 636 FOR OP/ED): Performed by: NURSE PRACTITIONER

## 2024-10-01 PROCEDURE — 93010 ELECTROCARDIOGRAM REPORT: CPT | Performed by: INTERNAL MEDICINE

## 2024-10-01 PROCEDURE — 2500000001 HC RX 250 WO HCPCS SELF ADMINISTERED DRUGS (ALT 637 FOR MEDICARE OP): Performed by: NURSE PRACTITIONER

## 2024-10-01 PROCEDURE — 5A2204Z RESTORATION OF CARDIAC RHYTHM, SINGLE: ICD-10-PCS | Performed by: PHYSICIAN ASSISTANT

## 2024-10-01 PROCEDURE — 99232 SBSQ HOSP IP/OBS MODERATE 35: CPT | Performed by: PHYSICIAN ASSISTANT

## 2024-10-01 PROCEDURE — 2500000001 HC RX 250 WO HCPCS SELF ADMINISTERED DRUGS (ALT 637 FOR MEDICARE OP): Performed by: PHYSICIAN ASSISTANT

## 2024-10-01 PROCEDURE — 2500000001 HC RX 250 WO HCPCS SELF ADMINISTERED DRUGS (ALT 637 FOR MEDICARE OP): Performed by: INTERNAL MEDICINE

## 2024-10-01 PROCEDURE — 93005 ELECTROCARDIOGRAM TRACING: CPT

## 2024-10-01 PROCEDURE — 1200000002 HC GENERAL ROOM WITH TELEMETRY DAILY

## 2024-10-01 RX ORDER — POTASSIUM CHLORIDE 1.5 G/1.58G
40 POWDER, FOR SOLUTION ORAL ONCE
Status: COMPLETED | OUTPATIENT
Start: 2024-10-01 | End: 2024-10-01

## 2024-10-01 RX ORDER — POTASSIUM CHLORIDE 20 MEQ/1
40 TABLET, EXTENDED RELEASE ORAL ONCE
Status: DISCONTINUED | OUTPATIENT
Start: 2024-10-01 | End: 2024-10-01

## 2024-10-01 RX ORDER — METOPROLOL SUCCINATE 25 MG/1
25 TABLET, EXTENDED RELEASE ORAL DAILY
Status: DISCONTINUED | OUTPATIENT
Start: 2024-10-01 | End: 2024-10-03

## 2024-10-01 SDOH — HEALTH STABILITY: MENTAL HEALTH
HOW OFTEN DO YOU NEED TO HAVE SOMEONE HELP YOU WHEN YOU READ INSTRUCTIONS, PAMPHLETS, OR OTHER WRITTEN MATERIAL FROM YOUR DOCTOR OR PHARMACY?: SOMETIMES

## 2024-10-01 SDOH — SOCIAL STABILITY: SOCIAL NETWORK: HOW OFTEN DO YOU ATTENT MEETINGS OF THE CLUB OR ORGANIZATION YOU BELONG TO?: MORE THAN 4 TIMES PER YEAR

## 2024-10-01 SDOH — SOCIAL STABILITY: SOCIAL NETWORK
DO YOU BELONG TO ANY CLUBS OR ORGANIZATIONS SUCH AS CHURCH GROUPS UNIONS, FRATERNAL OR ATHLETIC GROUPS, OR SCHOOL GROUPS?: YES

## 2024-10-01 SDOH — SOCIAL STABILITY: SOCIAL NETWORK: HOW OFTEN DO YOU ATTEND CHURCH OR RELIGIOUS SERVICES?: NEVER

## 2024-10-01 SDOH — HEALTH STABILITY: PHYSICAL HEALTH: ON AVERAGE, HOW MANY MINUTES DO YOU ENGAGE IN EXERCISE AT THIS LEVEL?: 0 MIN

## 2024-10-01 SDOH — SOCIAL STABILITY: SOCIAL NETWORK
IN A TYPICAL WEEK, HOW MANY TIMES DO YOU TALK ON THE PHONE WITH FAMILY, FRIENDS, OR NEIGHBORS?: MORE THAN THREE TIMES A WEEK

## 2024-10-01 SDOH — SOCIAL STABILITY: SOCIAL NETWORK: HOW OFTEN DO YOU GET TOGETHER WITH FRIENDS OR RELATIVES?: MORE THAN THREE TIMES A WEEK

## 2024-10-01 SDOH — HEALTH STABILITY: PHYSICAL HEALTH: ON AVERAGE, HOW MANY DAYS PER WEEK DO YOU ENGAGE IN MODERATE TO STRENUOUS EXERCISE (LIKE A BRISK WALK)?: 0 DAYS

## 2024-10-01 ASSESSMENT — ENCOUNTER SYMPTOMS
ORTHOPNEA: 0
SHORTNESS OF BREATH: 0
DYSURIA: 0
ABDOMINAL PAIN: 0
VOMITING: 0
FEVER: 0
WEAKNESS: 0
NAUSEA: 0
DIARRHEA: 0
WHEEZING: 0
PALPITATIONS: 0

## 2024-10-01 ASSESSMENT — COGNITIVE AND FUNCTIONAL STATUS - GENERAL
DAILY ACTIVITIY SCORE: 24
MOBILITY SCORE: 24

## 2024-10-01 ASSESSMENT — PAIN - FUNCTIONAL ASSESSMENT
PAIN_FUNCTIONAL_ASSESSMENT: 0-10

## 2024-10-01 ASSESSMENT — PAIN SCALES - GENERAL
PAINLEVEL_OUTOF10: 0 - NO PAIN

## 2024-10-01 NOTE — PROGRESS NOTES
PROGRESS NOTE    HPI:  Jatin Rodgers is a 74 y.o. male presenting with:     1. HFrEF: prior reduced LV function of 16%, thought to be tachy mediated. EF normalized in May 2021. Most recent LV function 45-50%.      2. 2. Atrial fibrillation: Patient has history of AF with RVR in December 2020. At that time his LV function was 16% with LV thrombus. He was eventually cardioverted and started on amiodarone. Repeat echocardiogram in May 2021 showed LV function normalized. At some point, patient was found to be in AF and amiodarone was discontinued and he was changed to rate control strategy. At the end of May 2023, patient presented to [facility] with diaphoresis and chest discomfort. He was transferred to [facility] and was found to be in AF RVR with hypotension. He had left heart catheterization which showed nonobstructive CAD. Echocardiogram showed LV function 45 to 50%. During hospitalization patient was found to have cholecystitis and had drain placed. He was rate controlled with metoprolol and digoxin. He had successful cholecystectomy.       We saw patient in October 2023. He was rate controlled at that time. AAD vs RFA was discussed and he was agreeable to ablation.     1/16/2024 successful PVI RFA     Unfortunately has had recurrent AF requiring frequent cardioversions. He comes in today for tikosyn initiation.       Subjective Data:  Patient sitting up in chair at bedside.  He had initial dose of Tikosyn last evening with no apparent side effects.  QTc is acceptable this morning.  On admission his potassium was low and was replaced but no follow-up labs have been done so we will do stat labs this morning prior to his second dose to make sure that potassium is at 4 and magnesium is at 2.  He voices no other cardiac complaints or concerns.  He usually follows with Dr. Lopez at [facility].    Overnight Events:    None     Objective Data:  Last Recorded Vitals:  Vitals:    09/30/24 2004 09/30/24 2346 10/01/24 0138  10/01/24 0756   BP: 124/84 (!) 128/91 113/77 110/65   BP Location: Left arm Right arm Left arm Left arm   Patient Position: Sitting Lying Sitting Lying   Pulse: 79 105 87 75   Resp: 18 18 18 19   Temp: 36.4 °C (97.5 °F) 36.3 °C (97.3 °F) 36.1 °C (97 °F) 36.2 °C (97.2 °F)   TempSrc: Temporal Temporal Temporal Temporal   SpO2: 93% 93% 94% 93%   Weight:   118 kg (260 lb 5.8 oz)    Height:           Last Labs:  CBC - 9/16/2024:  4:16 PM  8.4 16.7 246    53.3      CMP - 9/30/2024:  4:12 PM  8.6 6.5 16 --- 0.8   _ 4.2 15 62      PTT - No results in last year.  1.2   13.1 _     Last I/O:  I/O last 3 completed shifts:  In: 441 (3.7 mL/kg) [P.O.:441]  Out: 1200 (10.2 mL/kg) [Urine:1200 (0.3 mL/kg/hr)]  Weight: 118.1 kg     Past Cardiology Tests (Last 3 Years):  Echo:  CONCLUSIONS:   1. Left ventricular systolic function is normal.   2. Successful direct current cardioversion for atrial fibrillation resulting in sinus bradycardia.   3. The left atrium is moderately dilated.        Inpatient Medications:  Scheduled medications   Medication Dose Route Frequency    apixaban  5 mg oral BID    aspirin  81 mg oral Daily    dofetilide  500 mcg oral q12h KASEY    empagliflozin  10 mg oral Daily    furosemide  40 mg oral Daily    metFORMIN  500 mg oral BID    metoprolol succinate XL  50 mg oral Daily    sacubitriL-valsartan  1 tablet oral BID       Review of Systems   Constitutional: Negative for fever and malaise/fatigue.   Cardiovascular:  Negative for chest pain, orthopnea and palpitations.   Respiratory:  Negative for shortness of breath and wheezing.    Skin:  Negative for itching and rash.   Gastrointestinal:  Negative for abdominal pain, diarrhea, nausea and vomiting.   Genitourinary:  Negative for dysuria.   Neurological:  Negative for weakness.        Physical Exam  Constitutional:       General: He is not in acute distress.     Appearance: Normal appearance. He is obese.   HENT:      Mouth/Throat:      Mouth: Mucous membranes  are moist.   Neck:      Comments: No obvious JVD  Cardiovascular:      Rate and Rhythm: Normal rate. Rhythm irregular.      Heart sounds: Normal heart sounds. No murmur heard.     Comments: Telemetry shows A-fib flutter with heart rates in the 80s  Pulmonary:      Effort: Pulmonary effort is normal.      Breath sounds: Normal breath sounds.   Abdominal:      General: Abdomen is flat. Bowel sounds are normal.      Palpations: Abdomen is soft.   Musculoskeletal:         General: No swelling.   Skin:     General: Skin is warm and dry.   Neurological:      Mental Status: He is alert and oriented to person, place, and time.   Psychiatric:         Mood and Affect: Mood normal.        ASSESSMENT/PLAN  Persistent AF admit for tikosyn initiation.      Daily BMP/Mg, keep K>4, Mg>2, monitor renal function  ECG now and 2 hours after each tikosyn dose  Reduce metoprolol, discontinue digoxin/ranexa  Possible DCCV Wednesday if no spontaneous conversion  10-1-24: Patient remains in atrial arrhythmia.  Continue Tikosyn loading and plan for cardioversion in the morning.  He has had uninterrupted Eliquis.  Check stat labs before next dose just to make sure the potassium and magnesium levels are acceptable.  Will continue to monitor EKGs after subsequent dosing.  For now from heart failure standpoint appears well compensated with no cardiac complaints or concerns.  DCC in AM with Dr. Arguello.        Aryan Muñoz PA-C  10/1/2024  8:56 AM

## 2024-10-01 NOTE — CARE PLAN
The patient's goals for the shift include      The clinical goals for the shift include pt. will tolerate the initiation of tikosyn    Problem: Pain - Adult  Goal: Verbalizes/displays adequate comfort level or baseline comfort level  10/1/2024 1323 by Jennifer Maddox RN  Outcome: Progressing  10/1/2024 1322 by Jennifer Maddox RN  Outcome: Progressing     Problem: Safety - Adult  Goal: Free from fall injury  10/1/2024 1323 by Jennifer Maddox RN  Outcome: Progressing  10/1/2024 1322 by Jennifer Maddox RN  Outcome: Progressing     Problem: Discharge Planning  Goal: Discharge to home or other facility with appropriate resources  10/1/2024 1323 by Jennifer Maddox RN  Outcome: Progressing  10/1/2024 1322 by Jennifer Maddox RN  Outcome: Progressing     Problem: Chronic Conditions and Co-morbidities  Goal: Patient's chronic conditions and co-morbidity symptoms are monitored and maintained or improved  10/1/2024 1323 by Jennifer Maddox RN  Outcome: Progressing  10/1/2024 1322 by Jennifer Maddox RN  Outcome: Progressing     Problem: Skin  Goal: Decreased wound size/increased tissue granulation at next dressing change  Outcome: Progressing  Flowsheets (Taken 10/1/2024 1323)  Decreased wound size/increased tissue granulation at next dressing change:   Promote sleep for wound healing   Protective dressings over bony prominences  Goal: Participates in plan/prevention/treatment measures  Outcome: Progressing  Flowsheets (Taken 10/1/2024 1323)  Participates in plan/prevention/treatment measures:   Discuss with provider PT/OT consult   Elevate heels  Goal: Prevent/manage excess moisture  Outcome: Progressing  Flowsheets (Taken 10/1/2024 1323)  Prevent/manage excess moisture: Cleanse incontinence/protect with barrier cream  Goal: Prevent/minimize sheer/friction injuries  Outcome: Progressing  Flowsheets (Taken 10/1/2024 1323)  Prevent/minimize sheer/friction injuries: Increase activity/out of bed for  meals  Goal: Promote/optimize nutrition  Outcome: Progressing  Flowsheets (Taken 10/1/2024 1323)  Promote/optimize nutrition: Consume > 50% meals/supplements  Goal: Promote skin healing  Outcome: Progressing  Flowsheets (Taken 10/1/2024 1323)  Promote skin healing: Assess skin/pad under line(s)/device(s)

## 2024-10-01 NOTE — PROGRESS NOTES
10/01/24 1348   Discharge Planning   Assistance Needed jos Rasmussen assists if neeed. does his own Glucose moitoring 1 x week and medications. eior aprtment with grab bars and emergenc cord. Indepedent living   Number of Stairs to Enter Residence   (elevator)   Who is requesting discharge planning? Provider     Unable to contact patient. Daughter Valery's number is primary contact.  Called daughter. States her father always refers to her. His cell number is . ( I called room and cell , no answer).  He walks to the store and use the bus for transportation.  Confirmed address, insurance and PCP.   No home going needs at this time.

## 2024-10-01 NOTE — CARE PLAN
"    The clinical goals for the shift include \"Pt. will tolerate the initation of new medcation throughout shift\"    "

## 2024-10-01 NOTE — NURSING NOTE
Patients blood pressure 93/66 the first time and 99/65 the second time. Called del and said this patient has 50mg metoprolol xl ordered now. Provider gave me a verbal order to change the order to 25mg metoprolo xl.  Clarified with the provider that I should give the 25 mg metoprolol XL now with the soft Bp and dr.sotologo gonsalves to give.   Giving medication now.

## 2024-10-02 ENCOUNTER — APPOINTMENT (OUTPATIENT)
Dept: CARDIOLOGY | Facility: HOSPITAL | Age: 74
End: 2024-10-02
Payer: COMMERCIAL

## 2024-10-02 LAB
ANION GAP SERPL CALC-SCNC: 12 MMOL/L (ref 10–20)
BODY SURFACE AREA: 2.4 M2
BUN SERPL-MCNC: 12 MG/DL (ref 6–23)
CALCIUM SERPL-MCNC: 8.6 MG/DL (ref 8.6–10.3)
CHLORIDE SERPL-SCNC: 107 MMOL/L (ref 98–107)
CO2 SERPL-SCNC: 24 MMOL/L (ref 21–32)
CREAT SERPL-MCNC: 0.79 MG/DL (ref 0.5–1.3)
EGFRCR SERPLBLD CKD-EPI 2021: >90 ML/MIN/1.73M*2
GLUCOSE BLD MANUAL STRIP-MCNC: 113 MG/DL (ref 74–99)
GLUCOSE BLD MANUAL STRIP-MCNC: 124 MG/DL (ref 74–99)
GLUCOSE BLD MANUAL STRIP-MCNC: 140 MG/DL (ref 74–99)
GLUCOSE SERPL-MCNC: 110 MG/DL (ref 74–99)
MAGNESIUM SERPL-MCNC: 2.15 MG/DL (ref 1.6–2.4)
POTASSIUM SERPL-SCNC: 3.8 MMOL/L (ref 3.5–5.3)
SODIUM SERPL-SCNC: 139 MMOL/L (ref 136–145)

## 2024-10-02 PROCEDURE — 94681 O2 UPTK CO2 OUTP % O2 XTRC: CPT

## 2024-10-02 PROCEDURE — 92960 CARDIOVERSION ELECTRIC EXT: CPT | Performed by: INTERNAL MEDICINE

## 2024-10-02 PROCEDURE — 2500000001 HC RX 250 WO HCPCS SELF ADMINISTERED DRUGS (ALT 637 FOR MEDICARE OP): Performed by: NURSE PRACTITIONER

## 2024-10-02 PROCEDURE — 36415 COLL VENOUS BLD VENIPUNCTURE: CPT | Performed by: PHYSICIAN ASSISTANT

## 2024-10-02 PROCEDURE — 83735 ASSAY OF MAGNESIUM: CPT | Performed by: PHYSICIAN ASSISTANT

## 2024-10-02 PROCEDURE — 2500000004 HC RX 250 GENERAL PHARMACY W/ HCPCS (ALT 636 FOR OP/ED): Performed by: INTERNAL MEDICINE

## 2024-10-02 PROCEDURE — 80048 BASIC METABOLIC PNL TOTAL CA: CPT | Performed by: PHYSICIAN ASSISTANT

## 2024-10-02 PROCEDURE — 93010 ELECTROCARDIOGRAM REPORT: CPT | Performed by: INTERNAL MEDICINE

## 2024-10-02 PROCEDURE — 1200000002 HC GENERAL ROOM WITH TELEMETRY DAILY

## 2024-10-02 PROCEDURE — 2500000002 HC RX 250 W HCPCS SELF ADMINISTERED DRUGS (ALT 637 FOR MEDICARE OP, ALT 636 FOR OP/ED): Performed by: NURSE PRACTITIONER

## 2024-10-02 PROCEDURE — 92960 CARDIOVERSION ELECTRIC EXT: CPT

## 2024-10-02 PROCEDURE — 93005 ELECTROCARDIOGRAM TRACING: CPT

## 2024-10-02 PROCEDURE — 82947 ASSAY GLUCOSE BLOOD QUANT: CPT

## 2024-10-02 RX ORDER — POTASSIUM CHLORIDE 20 MEQ/1
40 TABLET, EXTENDED RELEASE ORAL ONCE
Status: COMPLETED | OUTPATIENT
Start: 2024-10-02 | End: 2024-10-02

## 2024-10-02 RX ORDER — PROPOFOL 10 MG/ML
INJECTION, EMULSION INTRAVENOUS AS NEEDED
Status: DISCONTINUED | OUTPATIENT
Start: 2024-10-02 | End: 2024-10-03 | Stop reason: HOSPADM

## 2024-10-02 ASSESSMENT — COGNITIVE AND FUNCTIONAL STATUS - GENERAL
DAILY ACTIVITIY SCORE: 24
DAILY ACTIVITIY SCORE: 24
MOBILITY SCORE: 24
MOBILITY SCORE: 24

## 2024-10-02 ASSESSMENT — PAIN SCALES - GENERAL
PAINLEVEL_OUTOF10: 0 - NO PAIN
PAINLEVEL_OUTOF10: 0 - NO PAIN

## 2024-10-02 ASSESSMENT — ENCOUNTER SYMPTOMS
SHORTNESS OF BREATH: 0
FALLS: 0
DIAPHORESIS: 0
DYSURIA: 0
ORTHOPNEA: 0
WHEEZING: 0
SYNCOPE: 0
DIARRHEA: 0
PALPITATIONS: 0
RESPIRATORY NEGATIVE: 1
EXCESSIVE APPETITE: 0

## 2024-10-02 ASSESSMENT — PAIN - FUNCTIONAL ASSESSMENT: PAIN_FUNCTIONAL_ASSESSMENT: 0-10

## 2024-10-02 NOTE — CARE PLAN
The patient's goals for the shift include      The clinical goals for the shift include pt. will tolerate the initiation of tikosyn

## 2024-10-02 NOTE — PROGRESS NOTES
Per Dr. Gómez patient should be ready for discharge tomorrow afternoon. Pt will discharge home without any needs.    Problem: At Risk for Falls  Goal: # Patient does not fall  Outcome: Outcome Met, Continue evaluating goal progress toward completion     Problem: Pain  Goal: #Acceptable pain level achieved/maintained at rest using NRS/Faces  Description: This goal is used for patients who can self-report.   Acceptable means the level is at or below the identified comfort/function goal.  Outcome: Outcome Met, Continue evaluating goal progress toward completion     Problem: Pressure Injury, Risk for  Goal: # Skin remains intact  Outcome: Outcome Met, Continue evaluating goal progress toward completion     Problem: VTE, Risk for  Goal: # No s/s of VTE  Outcome: Outcome Met, Continue evaluating goal progress toward completion

## 2024-10-02 NOTE — PROGRESS NOTES
"Cardiology Preprocedure Note    Jatin Rodgers   Indication for procedure: The primary encounter diagnosis was Ventricular tachycardia (Multi). A diagnosis of Paroxysmal atrial fibrillation (Multi) was also pertinent to this visit. irregular rhythm clear lungs        BP (!) 125/100   Pulse 103   Temp 36.3 °C (97.4 °F) (Temporal)   Resp 11   Ht 1.753 m (5' 9.02\")   Wt 118 kg (260 lb 5.8 oz)   SpO2 98%   BMI 38.43 kg/m²    Relevant Labs:   Lab Results   Component Value Date    CREATININE 0.79 10/02/2024    EGFR >90 10/02/2024    INR 1.2 (H) 03/27/2024    PROTIME 13.1 (H) 03/27/2024       Planned Sedation/Anesthesia: Deep    Airway assessment: normal    Directed physical examination: General: Alert and Oriented, No distress, cooperative. Lungs: Clear to auscultation bilaterally, no wheezes, rhonci, or rales. respirations unlabored Heart: regular rate and rhythm, S1 and S2, no murmur normal exam    Mallampati: II (hard and soft palate, upper portion of tonsils and uvula visible)    ASA Score: ASA 2 - Patient with mild systemic disease with no functional limitations    Benefits, risks and alternatives of procedure and planned sedation have been discussed with the patient and/or their representative. All questions answered and they agree to proceed.     Guadalupe Arguello MD   "

## 2024-10-02 NOTE — CARE PLAN
The patient's goals for the shift include      The clinical goals for the shift include pt will tolerate the initiation of Tikosyn    Over the shift, the patient did make progress toward the following goals. Barriers to progression include BP lower. Recommendations to address these barriers include routine monitoring.

## 2024-10-02 NOTE — PROGRESS NOTES
HPI:  Jatin Rodgers is a 74 y.o. male presenting with:     Patient has history of AF with RVR in December 2020. At that time his LV function was 16% with LV thrombus. He was eventually cardioverted and started on amiodarone. Repeat echocardiogram in May 2021 showed LV function normalized. At some point, patient was found to be in AF and amiodarone was discontinued and he was changed to rate control strategy. At the end of May 2023, patient presented to [facility] [place] with diaphoresis and chest discomfort. He was transferred to [facility] and was found to be in AF RVR with hypotension. He had left heart catheterization which showed nonobstructive CAD. Echocardiogram showed LV function 45 to 50%. During hospitalization patient was found to have cholecystitis and had drain placed. He was rate controlled with metoprolol and digoxin. He had successful cholecystectomy.       We saw patient in October 2023. He was rate controlled at that time. AAD vs RFA was discussed and he was agreeable to ablation.     1/16/2024 successful PVI RFA     Unfortunately has had recurrent AF requiring frequent cardioversions. He comes in today for tikosyn initiation.    Subjective Data:  Patient with complaint of insomnia overnight.     Overnight Events:    None     Objective Data:  Last Recorded Vitals:  Vitals:    10/01/24 1557 10/01/24 2023 10/02/24 0023 10/02/24 0436   BP: 99/65 129/84 129/72 (!) 139/97   BP Location:  Left arm Left arm Left arm   Patient Position:  Sitting Sitting Lying   Pulse:  91 102 92   Resp:  18 18 19   Temp:  36.3 °C (97.3 °F) 36.8 °C (98.2 °F) 36.3 °C (97.4 °F)   TempSrc:  Temporal Temporal Temporal   SpO2:  95% 94% 93%   Weight:       Height:           Last Labs:  CBC - 9/16/2024:  4:16 PM  8.4 16.7 246    53.3      CMP - 10/2/2024:  4:04 AM  8.6 6.5 16 --- 0.8   _ 4.2 15 62      PTT - No results in last year.  1.2   13.1 _     TROPHS   Date/Time Value Ref Range Status   05/27/2023 06:34 AM 6 0 - 20 ng/L Final     Comment:      .  Less than 99th percentile of normal range cutoff-  Female and children under 18 years old <14 ng/L; Male <21 ng/L: Negative  Repeat testing should be performed if clinically indicated.   .  Female and children under 18 years old 14-50 ng/L; Male 21-50 ng/L:  Consistent with possible cardiac damage and possible increased clinical   risk. Serial measurements may help to assess extent of myocardial damage.   .  >50 ng/L: Consistent with cardiac damage, increased clinical risk and  myocardial infarction. Serial measurements may help assess extent of   myocardial damage.   .   NOTE: Children less than 1 year old may have higher baseline troponin   levels and results should be interpreted in conjunction with the overall   clinical context.   .  NOTE: Troponin I testing is performed using a different   testing methodology at [facility] than at other   [facility]. Direct result comparisons should only   be made within the same method.       HGBA1C   Date/Time Value Ref Range Status   10/27/2023 10:41 AM 6.2 4.2 - 5.6 % Final     Comment:     Location:[facility], [address] [place] [address], [place], [address]  Point of care (POC) Hemoglobin A1c (HGBA1C) testing is intended to assess  glucose control and provide a management tool for patients known to have  diabetes and their healthcare providers.  Target HGBA1C levels may depend on  specific clinical circumstances.  POC HGBA1C is not intended for use as a  diagnostic or screening test; laboratory-based testing should be used for  diagnostic purposes.  The following information is supplemental and may not  be applicable to specific diabetes management situations:  The POC device   provides a normal range of 4.2% to 6.5% for the HGBA1C POC test.  However, the American Diabetes Association  guidelines indicate that  patients with HGBA1C in the range of 5.7% to 6.4% are at increased risk for  development of diabetes and that intervention by  lifestyle modification may  be beneficial.  A HGBA1C level greater than or equal to 6.5% is considered  diagnostic of diabetes, pending confirmatory testing.  Use of HGBA1C testing  to evaluate glucose control may not be appropriate for patients with  hemoglobin variants or other conditions (e.g. anemia) that alter red blood  cell lifespan.   05/27/2023 06:34 AM 6.4 % Final     Comment:          Diagnosis of Diabetes-Adults   Non-Diabetic: < or = 5.6%   Increased risk for developing diabetes: 5.7-6.4%   Diagnostic of diabetes: > or = 6.5%  .       Monitoring of Diabetes                Age (y)     Therapeutic Goal (%)   Adults:          >18           <7.0   Pediatrics:    13-18           <7.5                   7-12           <8.0                   0- 6            7.5-8.5   American Diabetes Association. Diabetes Care 33(S1), Jan 2010.     03/15/2023 02:07 PM 6.5 4.2 - 5.6 % Final     Comment:     Location:[facility], [facility] [address], [place], [address]  Point of care (POC) Hemoglobin A1c (HGBA1C) testing is intended to assess  glucose control and provide a management tool for patients known to have  diabetes and their healthcare providers.  Target HGBA1C levels may depend on  specific clinical circumstances.  POC HGBA1C is not intended for use as a  diagnostic or screening test; laboratory-based testing should be used for  diagnostic purposes.  The following information is supplemental and may not  be applicable to specific diabetes management situations:  The POC device   provides a normal range of 4.2% to 6.5% for the HGBA1C POC test.  However, the American Diabetes Association  guidelines indicate that  patients with HGBA1C in the range of 5.7% to 6.4% are at increased risk for  development of diabetes and that intervention by lifestyle modification may  be beneficial.  A HGBA1C level greater than or equal to 6.5% is considered  diagnostic of diabetes, pending confirmatory testing.  Use of  "HGBA1C testing  to evaluate glucose control may not be appropriate for patients with  hemoglobin variants or other conditions (e.g. anemia) that alter red blood  cell lifespan.     LDLCALC   Date/Time Value Ref Range Status   09/16/2024 04:16 PM 44 <=99 mg/dL Final     Comment:                                 Near   Borderline      AGE      Desirable  Optimal    High     High     Very High     0-19 Y     0 - 109     ---    110-129   >/= 130     ----    20-24 Y     0 - 119     ---    120-159   >/= 160     ----      >24 Y     0 -  99   100-129  130-159   160-189     >/=190       VLDL   Date/Time Value Ref Range Status   09/16/2024 04:16 PM 23 0 - 40 mg/dL Final   05/27/2023 06:34 AM 11 0 - 40 mg/dL Final      Last I/O:  I/O last 3 completed shifts:  In: 491.4 (4.2 mL/kg) [P.O.:441; I.V.:50.4 (0.4 mL/kg)]  Out: 1000 (8.5 mL/kg) [Urine:1000 (0.2 mL/kg/hr)]  Weight: 118.1 kg     Past Cardiology Tests (Last 3 Years):  EKG:  Electrocardiogram 12 lead 10/01/2024      Electrocardiogram 12 Lead 09/30/2024      Electrocardiogram 12 Lead 09/30/2024      ECG 12 Lead 09/16/2024      ECG 12 Lead 06/13/2024      ECG 12 Lead 04/01/2024      ECG 12 lead (Clinic Performed) 03/25/2024      ECG 12 Lead       ECG 12 Lead       ECG 12 lead (Clinic Performed) 03/01/2024      ECG 12 lead (Clinic Performed) 10/26/2023    Echo:  Transesophageal Echo (ZHANNA) With Possible Cardioversion 04/01/2024  CONCLUSIONS:   1. Left ventricular systolic function is normal.   2. Successful direct current cardioversion for atrial fibrillation resulting in sinus bradycardia.   3. The left atrium is moderately dilated.  Ejection Fractions:  No results found for: \"EF\"  Cath:  No results found for this or any previous visit from the past 1095 days.    Stress Test:  No results found for this or any previous visit from the past 1095 days.    Cardiac Imaging:  No results found for this or any previous visit from the past 1095 days.      Inpatient " Medications:  Scheduled medications   Medication Dose Route Frequency    apixaban  5 mg oral BID    aspirin  81 mg oral Daily    dofetilide  500 mcg oral q12h KASEY    empagliflozin  10 mg oral Daily    furosemide  40 mg oral Daily    metFORMIN  500 mg oral BID    [Held by provider] metoprolol succinate XL  25 mg oral Daily    sacubitriL-valsartan  1 tablet oral BID     PRN medications   Medication    dextrose    dextrose    glucagon    glucagon     Continuous Medications   Medication Dose Last Rate     Review of Systems   Constitutional: Negative for diaphoresis and malaise/fatigue.   Cardiovascular:  Negative for chest pain, orthopnea, palpitations and syncope.   Respiratory: Negative.  Negative for shortness of breath and wheezing.    Skin:  Negative for rash.   Musculoskeletal:  Negative for falls and muscle weakness.   Gastrointestinal:  Negative for diarrhea, dysphagia and excessive appetite.   Genitourinary:  Negative for dysuria.      Physical Exam  Constitutional:       Appearance: Normal appearance.   Eyes:      Pupils: Pupils are equal, round, and reactive to light.   Cardiovascular:      Rate and Rhythm: Normal rate. Rhythm irregular.      Heart sounds: Normal heart sounds.   Pulmonary:      Effort: Pulmonary effort is normal.      Breath sounds: Normal breath sounds.   Musculoskeletal:         General: Normal range of motion.   Skin:     General: Skin is warm and dry.   Neurological:      Mental Status: He is alert and oriented to person, place, and time.       Assessment/Plan   Atrial fibrillation  Admitted for dofetilide initiation  ECG with Qtc 480ms  We will hold metoprolol  Cardioversion scheduled for later today  Will replete K of 3.8  Mg 2.15.  Will continuing dofetilide with ECG 2 hours after each dose  Continue OAC  Continue telemetry  Repeat BMP and Mg tomorrow AM   Planning for discharge tomorrow after 6 dose and ECG review    Peripheral IV 09/30/24 20 G Right;Posterior Hand (Active)   Site  Assessment Clean;Dry;Intact 10/01/24 2100   Dressing Status Clean;Dry;Occlusive 10/01/24 2100   Number of days: 2       Peripheral IV 09/30/24 20 G Left;Proximal;Anterior Forearm (Active)   Site Assessment Clean;Dry;Intact 10/01/24 2100   Dressing Status Clean;Dry;Occlusive 10/01/24 2100   Number of days: 2       Code Status:  Full Code    I spent 40 minutes in the professional and overall care of this patient.        Teresa Whyte, APRN-CNP

## 2024-10-03 ENCOUNTER — APPOINTMENT (OUTPATIENT)
Dept: CARDIOLOGY | Facility: HOSPITAL | Age: 74
End: 2024-10-03
Payer: COMMERCIAL

## 2024-10-03 ENCOUNTER — PHARMACY VISIT (OUTPATIENT)
Dept: PHARMACY | Facility: CLINIC | Age: 74
End: 2024-10-03
Payer: COMMERCIAL

## 2024-10-03 VITALS
BODY MASS INDEX: 38.32 KG/M2 | TEMPERATURE: 97.8 F | OXYGEN SATURATION: 96 % | HEIGHT: 69 IN | DIASTOLIC BLOOD PRESSURE: 81 MMHG | WEIGHT: 258.71 LBS | RESPIRATION RATE: 18 BRPM | HEART RATE: 74 BPM | SYSTOLIC BLOOD PRESSURE: 113 MMHG

## 2024-10-03 PROBLEM — I47.20 VENTRICULAR TACHYCARDIA (MULTI): Status: RESOLVED | Noted: 2024-09-30 | Resolved: 2024-10-03

## 2024-10-03 LAB
ANION GAP SERPL CALC-SCNC: 11 MMOL/L (ref 10–20)
ATRIAL RATE: 111 BPM
ATRIAL RATE: 75 BPM
ATRIAL RATE: 82 BPM
BUN SERPL-MCNC: 13 MG/DL (ref 6–23)
CALCIUM SERPL-MCNC: 9 MG/DL (ref 8.6–10.3)
CHLORIDE SERPL-SCNC: 104 MMOL/L (ref 98–107)
CO2 SERPL-SCNC: 26 MMOL/L (ref 21–32)
CREAT SERPL-MCNC: 0.75 MG/DL (ref 0.5–1.3)
EGFRCR SERPLBLD CKD-EPI 2021: >90 ML/MIN/1.73M*2
GLUCOSE BLD MANUAL STRIP-MCNC: 100 MG/DL (ref 74–99)
GLUCOSE BLD MANUAL STRIP-MCNC: 94 MG/DL (ref 74–99)
GLUCOSE SERPL-MCNC: 136 MG/DL (ref 74–99)
MAGNESIUM SERPL-MCNC: 2 MG/DL (ref 1.6–2.4)
P AXIS: 27 DEGREES
P AXIS: 37 DEGREES
P OFFSET: 172 MS
P ONSET: 91 MS
POTASSIUM SERPL-SCNC: 3.8 MMOL/L (ref 3.5–5.3)
PR INTERVAL: 234 MS
PR INTERVAL: 238 MS
Q ONSET: 207 MS
Q ONSET: 208 MS
Q ONSET: 254 MS
QRS COUNT: 12 BEATS
QRS COUNT: 13 BEATS
QRS COUNT: 19 BEATS
QRS DURATION: 108 MS
QRS DURATION: 92 MS
QRS DURATION: 96 MS
QT INTERVAL: 288 MS
QT INTERVAL: 392 MS
QT INTERVAL: 420 MS
QTC CALCULATION(BAZETT): 395 MS
QTC CALCULATION(BAZETT): 458 MS
QTC CALCULATION(BAZETT): 469 MS
QTC FREDERICIA: 355 MS
QTC FREDERICIA: 435 MS
QTC FREDERICIA: 452 MS
R AXIS: -44 DEGREES
R AXIS: -46 DEGREES
R AXIS: -54 DEGREES
SODIUM SERPL-SCNC: 137 MMOL/L (ref 136–145)
T AXIS: 103 DEGREES
T AXIS: 115 DEGREES
T AXIS: 153 DEGREES
T OFFSET: 351 MS
T OFFSET: 418 MS
T OFFSET: 450 MS
VENTRICULAR RATE: 113 BPM
VENTRICULAR RATE: 75 BPM
VENTRICULAR RATE: 82 BPM

## 2024-10-03 PROCEDURE — 99239 HOSP IP/OBS DSCHRG MGMT >30: CPT | Performed by: NURSE PRACTITIONER

## 2024-10-03 PROCEDURE — 82947 ASSAY GLUCOSE BLOOD QUANT: CPT

## 2024-10-03 PROCEDURE — 2500000002 HC RX 250 W HCPCS SELF ADMINISTERED DRUGS (ALT 637 FOR MEDICARE OP, ALT 636 FOR OP/ED): Performed by: NURSE PRACTITIONER

## 2024-10-03 PROCEDURE — 83735 ASSAY OF MAGNESIUM: CPT | Performed by: NURSE PRACTITIONER

## 2024-10-03 PROCEDURE — RXMED WILLOW AMBULATORY MEDICATION CHARGE

## 2024-10-03 PROCEDURE — 36415 COLL VENOUS BLD VENIPUNCTURE: CPT | Performed by: NURSE PRACTITIONER

## 2024-10-03 PROCEDURE — 2500000001 HC RX 250 WO HCPCS SELF ADMINISTERED DRUGS (ALT 637 FOR MEDICARE OP): Performed by: NURSE PRACTITIONER

## 2024-10-03 PROCEDURE — 93005 ELECTROCARDIOGRAM TRACING: CPT

## 2024-10-03 PROCEDURE — 93010 ELECTROCARDIOGRAM REPORT: CPT | Performed by: INTERNAL MEDICINE

## 2024-10-03 PROCEDURE — 80048 BASIC METABOLIC PNL TOTAL CA: CPT | Performed by: NURSE PRACTITIONER

## 2024-10-03 RX ORDER — METOPROLOL SUCCINATE 25 MG/1
25 TABLET, EXTENDED RELEASE ORAL DAILY
Qty: 90 TABLET | Refills: 3 | Status: CANCELLED | OUTPATIENT
Start: 2024-10-03 | End: 2025-10-03

## 2024-10-03 RX ORDER — METOPROLOL SUCCINATE 25 MG/1
25 TABLET, EXTENDED RELEASE ORAL DAILY
Qty: 90 TABLET | Refills: 3 | Status: SHIPPED | OUTPATIENT
Start: 2024-10-03 | End: 2025-10-03

## 2024-10-03 RX ORDER — DOFETILIDE 0.5 MG/1
500 CAPSULE ORAL EVERY 12 HOURS SCHEDULED
Qty: 60 CAPSULE | Refills: 1 | Status: SHIPPED | OUTPATIENT
Start: 2024-10-03 | End: 2024-12-02

## 2024-10-03 RX ORDER — METOPROLOL SUCCINATE 25 MG/1
25 TABLET, EXTENDED RELEASE ORAL DAILY
Status: DISCONTINUED | OUTPATIENT
Start: 2024-10-03 | End: 2024-10-03 | Stop reason: HOSPADM

## 2024-10-03 RX ORDER — POTASSIUM CHLORIDE 20 MEQ/1
40 TABLET, EXTENDED RELEASE ORAL ONCE
Status: COMPLETED | OUTPATIENT
Start: 2024-10-03 | End: 2024-10-03

## 2024-10-03 ASSESSMENT — COGNITIVE AND FUNCTIONAL STATUS - GENERAL
DAILY ACTIVITIY SCORE: 24
MOBILITY SCORE: 24
MOBILITY SCORE: 24
DAILY ACTIVITIY SCORE: 24

## 2024-10-03 ASSESSMENT — PAIN - FUNCTIONAL ASSESSMENT
PAIN_FUNCTIONAL_ASSESSMENT: 0-10
PAIN_FUNCTIONAL_ASSESSMENT: 0-10

## 2024-10-03 ASSESSMENT — PAIN SCALES - GENERAL
PAINLEVEL_OUTOF10: 0 - NO PAIN
PAINLEVEL_OUTOF10: 0 - NO PAIN

## 2024-10-03 NOTE — CARE PLAN
The patient's goals for the shift include      The clinical goals for the shift include no reactions to med

## 2024-10-03 NOTE — CARE PLAN
Problem: Pain - Adult  Goal: Verbalizes/displays adequate comfort level or baseline comfort level  Outcome: Progressing     Problem: Safety - Adult  Goal: Free from fall injury  Outcome: Progressing     Problem: Discharge Planning  Goal: Discharge to home or other facility with appropriate resources  Outcome: Progressing     Problem: Chronic Conditions and Co-morbidities  Goal: Patient's chronic conditions and co-morbidity symptoms are monitored and maintained or improved  Outcome: Progressing   The patient's goals for the shift include      The clinical goals for the shift include patient will remain hemodynamically stable

## 2024-10-03 NOTE — DISCHARGE SUMMARY
Discharge Diagnosis  A-fib (Multi)    Issues Requiring Follow-Up  Dofetilide monitoring    Test Results Pending At Discharge  Pending Labs       No current pending labs.            Hospital Course   Patient has history of AF with RVR in December 2020. At that time his LV function was 16% with LV thrombus. He was eventually cardioverted and started on amiodarone. Repeat echocardiogram in May 2021 showed LV function normalized. At some point, patient was found to be in AF and amiodarone was discontinued and he was changed to rate control strategy. At the end of May 2023, patient presented to [facility] with diaphoresis and chest discomfort. He was transferred to [facility] and was found to be in AF RVR with hypotension. He had left heart catheterization which showed nonobstructive CAD. Echocardiogram showed LV function 45 to 50%. During hospitalization patient was found to have cholecystitis and had drain placed. He was rate controlled with metoprolol and digoxin. He had successful cholecystectomy.       We saw patient in October 2023. He was rate controlled at that time. AAD vs RFA was discussed and he was agreeable to ablation.     1/16/2024 successful PVI RFA     Unfortunately has had recurrent AF requiring frequent cardioversions. He comes in today for tikosyn initiation.    9/30 Admitted for dofetilide initiation  ECG with Qtc 480ms  We will hold metoprolol  Cardioversion scheduled for later today  Will replete K of 3.8  Mg 2.15.  Will continuing dofetilide with ECG 2 hours after each dose  Continue OAC  Continue telemetry  Repeat BMP and Mg tomorrow AM   Planning for discharge tomorrow after 6 dose and ECG review    10/2/2024 successful cardioversion    10/3/2024  Maintaining sinus rhythm and has received 6th dose of dofetilide  Awaiting repeat ECG  Telemetry review sinus rhythm with brief episodes of tachycardia   Will start patient on KCL 40MeQ daily to maintain K>4  Mg >2  Will restart metop suc 25mg daily  Plan to  continue patient on dofetilide 500mcg every 12 hours  Discussed with patient the importance of taking medication every 12 hours with no missed doses and the need to contact the office if he misses 3 or more doses  We will repeat lab work in 2 weeks  Follow up in the office in 1 month    Discharge instructions reviewed with patient and with his daughter Valery via phone call    Pertinent Physical Exam At Time of Discharge  Physical Exam  Constitutional:       Appearance: Normal appearance.   Eyes:      Pupils: Pupils are equal, round, and reactive to light.   Cardiovascular:      Rate and Rhythm: Normal rate and regular rhythm.      Heart sounds: Normal heart sounds.   Pulmonary:      Effort: Pulmonary effort is normal.      Breath sounds: Normal breath sounds.   Musculoskeletal:         General: Normal range of motion.   Skin:     General: Skin is warm and dry.   Neurological:      Mental Status: He is alert and oriented to person, place, and time.         Home Medications     Medication List      START taking these medications     dofetilide 500 mcg capsule; Commonly known as: Tikosyn; Take 1 capsule   (500 mcg) by mouth every 12 hours.     CHANGE how you take these medications     metoprolol succinate XL 25 mg 24 hr tablet; Commonly known as: Toprol   XL; Take 1 tablet (25 mg) by mouth once daily. Do not crush or chew.; What   changed: medication strength, how much to take   nitroglycerin 0.4 mg SL tablet; Commonly known as: Nitrostat; What   changed: Another medication with the same name was removed. Continue   taking this medication, and follow the directions you see here.     CONTINUE taking these medications     aspirin 81 mg EC tablet   Eliquis 5 mg tablet; Generic drug: apixaban; Take 1 tablet (5 mg) by   mouth 2 times a day. PLEASE RESUME TONIGHT AS SCHEDULED 1/17/24   empagliflozin 10 mg; Commonly known as: Jardiance; Take 1 tablet (10 mg)   by mouth once daily.   furosemide 40 mg tablet; Commonly known as:  Lasix; Take 1 tablet (40 mg)   by mouth once daily.   metFORMIN 500 mg tablet; Commonly known as: Glucophage   OneTouch Delica Plus Lancet 30 gauge misc; Generic drug: lancets   OneTouch Verio test strips strip; Generic drug: blood sugar diagnostic   sacubitriL-valsartan 49-51 mg tablet; Commonly known as: Entresto; Take   1 tablet by mouth 2 times a day.     STOP taking these medications     digoxin 125 MCG tablet; Commonly known as: Lanoxin   pantoprazole 40 mg EC tablet; Commonly known as: ProtoNix   ranolazine 500 mg 12 hr tablet; Commonly known as: Ranexa       Outpatient Follow-Up  Future Appointments   Date Time Provider Department Center   11/14/2024 11:00 AM FREDRICK Leonard [id number] [facility]   12/30/2024 11:30 AM FREDRICK Brasher [id number] [facility]     40 minutes spent on coordination of discharge    WALTER eLonardCNP

## 2024-10-04 DIAGNOSIS — I48.91 ATRIAL FIBRILLATION, UNSPECIFIED TYPE (MULTI): Primary | ICD-10-CM

## 2024-10-04 RX ORDER — POTASSIUM CHLORIDE 20 MEQ/1
40 TABLET, EXTENDED RELEASE ORAL DAILY
Qty: 180 TABLET | Refills: 1 | Status: SHIPPED | OUTPATIENT
Start: 2024-10-04 | End: 2025-04-02

## 2024-10-04 NOTE — PROGRESS NOTES
Notified patient/daughter that KCL supplement was sent to pharmacy and lab work ordered to be done in 2 weeks. Follow up is scheduled.

## 2024-10-10 ENCOUNTER — LAB (OUTPATIENT)
Dept: LAB | Facility: LAB | Age: 74
End: 2024-10-10
Payer: COMMERCIAL

## 2024-10-10 DIAGNOSIS — I48.91 ATRIAL FIBRILLATION, UNSPECIFIED TYPE (MULTI): ICD-10-CM

## 2024-10-10 LAB
ANION GAP SERPL CALC-SCNC: 13 MMOL/L (ref 10–20)
BUN SERPL-MCNC: 11 MG/DL (ref 6–23)
CALCIUM SERPL-MCNC: 8.9 MG/DL (ref 8.6–10.3)
CHLORIDE SERPL-SCNC: 100 MMOL/L (ref 98–107)
CO2 SERPL-SCNC: 30 MMOL/L (ref 21–32)
CREAT SERPL-MCNC: 0.81 MG/DL (ref 0.5–1.3)
EGFRCR SERPLBLD CKD-EPI 2021: >90 ML/MIN/1.73M*2
GLUCOSE SERPL-MCNC: 90 MG/DL (ref 74–99)
POTASSIUM SERPL-SCNC: 4.2 MMOL/L (ref 3.5–5.3)
SODIUM SERPL-SCNC: 139 MMOL/L (ref 136–145)

## 2024-10-10 PROCEDURE — 80048 BASIC METABOLIC PNL TOTAL CA: CPT

## 2024-10-10 PROCEDURE — 36415 COLL VENOUS BLD VENIPUNCTURE: CPT

## 2024-10-16 ENCOUNTER — TELEPHONE (OUTPATIENT)
Dept: CARDIOLOGY | Facility: HOSPITAL | Age: 74
End: 2024-10-16
Payer: COMMERCIAL

## 2024-10-16 NOTE — TELEPHONE ENCOUNTER
BMP was reviewed by Teresa and called to the patient's daughter. Potassium is up to 4.2. Patient should continue potassium supplement and Tikosyn and follow up with Teresa as scheduled in November. Patient's daughter verbalized understanding.

## 2024-11-13 NOTE — PROGRESS NOTES
Electrophysiology Follow up Visit    History of Present Illness:  Jatin Rodgers is a 74 y.o. year old male patient with    1. HFrEF: prior reduced LV function of 16%, thought to be tachy mediated. EF normalized in May 2021. Most recent LV function 45-50%. Managed by general cardiology     2. Atrial fibrillation: Patient has history of AF with RVR in December 2020. At that time his LV function was 16% with LV thrombus. He was eventually cardioverted and started on amiodarone. Repeat echocardiogram in May 2021 showed LV function normalized. At some point, patient was found to be in AF and amiodarone was discontinued and he was changed to rate control strategy. At the end of May 2023, patient presented to [facility] with diaphoresis and chest discomfort. He was transferred to [facility] and was found to be in AF RVR with hypotension. He had left heart catheterization which showed nonobstructive CAD. Echocardiogram showed LV function 45 to 50%. During hospitalization patient was found to have cholecystitis and had drain placed. He was rate controlled with metoprolol and digoxin. He had successful cholecystectomy.       We saw patient in October 2023. He was rate controlled at that time. AAD vs RFA was discussed and he was agreeable to ablation.     1/16/2024 successful PVI RFA    We saw patient in March 2024.  He had a reoccurrence of atrial fibrillation. He had successful cardioversion in April 2024 with another recurrence of atrial fibrillation.  Patient was admitted in October 2024 and loaded with dofetilide and had successful cardioversion.  He was discharged on dofetilide 500 mcg every 12 hours.    Patient here for follow-up for atrial fibrillation and dofetilide monitoring. Patient is feeling well with no palpitations, lightheadedness, syncope. He is compliant with daily medications and denies any bleeding concerns on OAC.    Medical History:  He has no past medical history on file.    Surgical History:  He has a past surgical  history that includes IR biliary cholangiogram (7/3/2023) and Cardiac electrophysiology procedure (N/A, 1/16/2024).     Social History:  He reports that he has never smoked. He has never used smokeless tobacco. He reports that he does not drink alcohol and does not use drugs.         Family History   Problem Relation Name Age of Onset    No Known Problems Mother      Other (heart valve replaced) Father          ROS:  A 14 point review of systems was done and is negative other than as stated in HPI    Physical Exam  Constitutional:       Appearance: Normal appearance.   Eyes:      Pupils: Pupils are equal, round, and reactive to light.   Cardiovascular:      Rate and Rhythm: Normal rate and regular rhythm.      Heart sounds: Normal heart sounds.   Pulmonary:      Effort: Pulmonary effort is normal.      Breath sounds: Normal breath sounds.   Musculoskeletal:         General: Normal range of motion.   Skin:     General: Skin is warm and dry.   Neurological:      Mental Status: He is alert and oriented to person, place, and time.         Allergies:  Patient has no known allergies.    Outpatient Medications:  Current Outpatient Medications   Medication Instructions    aspirin 81 mg, Daily    dofetilide (TIKOSYN) 500 mcg, oral, Every 12 hours scheduled    Eliquis 5 mg, oral, 2 times daily, PLEASE RESUME TONIGHT AS SCHEDULED 1/17/24    empagliflozin (JARDIANCE) 10 mg, oral, Daily    furosemide (LASIX) 40 mg, oral, Daily RT    lancets (OneTouch Delica Plus Lancet) 30 gauge misc No dose, route, or frequency recorded.    metFORMIN (GLUCOPHAGE) 500 mg, 2 times daily    metoprolol succinate XL (TOPROL XL) 25 mg, oral, Daily, Do not crush or chew.    nitroglycerin (NITROSTAT) 0.4 mg, Every 5 min PRN    OneTouch Verio test strips strip 2 times daily    potassium chloride CR (Klor-Con M20) 20 mEq ER tablet 40 mEq, oral, Daily, Do not crush, chew, or split.    sacubitriL-valsartan (Entresto) 49-51 mg tablet 1 tablet, oral, 2 times  "daily         Reviewed Labs:  CBC  Lab Results   Component Value Date    WBC 8.4 09/16/2024    HGB 16.7 09/16/2024    HCT 53.3 (H) 09/16/2024    MCV 91 09/16/2024     09/16/2024        Renal Function Panel  Lab Results   Component Value Date    GLUCOSE 90 10/10/2024     10/10/2024    K 4.2 10/10/2024     10/10/2024    CO2 30 10/10/2024    ANIONGAP 13 10/10/2024    BUN 11 10/10/2024    CREATININE 0.81 10/10/2024    GFRMALE >90 08/01/2023    CALCIUM 8.9 10/10/2024        CMP  Lab Results   Component Value Date    CALCIUM 8.9 10/10/2024    PHOS 3.9 05/27/2023    PROT 6.5 09/16/2024    ALBUMIN 4.2 09/16/2024    AST 16 09/16/2024    ALT 15 09/16/2024    ALKPHOS 62 09/16/2024    BILITOT 0.8 09/16/2024        Mg   Lab Results   Component Value Date    MG 2.00 10/03/2024        Thyroid  Lab Results   Component Value Date    TSH 1.32 05/27/2023        Visit Vitals  /73   Pulse 78   Ht 1.753 m (5' 9.02\")   Wt 115 kg (254 lb)   BMI 37.49 kg/m²   Smoking Status Never   BSA 2.37 m²           Cardiac Testing Reviewed Study(s):  ZHANNA/DCCV (April/2024):  CONCLUSIONS:   1. Left ventricular systolic function is normal.   2. Successful direct current cardioversion for atrial fibrillation resulting in sinus bradycardia.   3. The left atrium is moderately dilated.  Echo (May/2023):   CONCLUSIONS:  1. Left ventricular systolic function is low normal with a 45-50% estimated ejection fraction.  2. There is reduced right ventricular systolic function.  3. The patient is in atrial fibrillation which may influence the estimate of left ventricular function and transvalvular flows.  Cath (May/2023):   CONCLUSIONS:  1. Non-obstructive coronary artery disease.  ECGs (reviewed and my interpretation):   3/1/2024 atrial fibrillation with rate of 81 bpm  11/14/2024 sinus rhythm with rate of 78 bpm with  ms    Assessment  Atrial fibrillation: Patient maintaining sinus rhythm on dofetilide.  EKG today personally reviewed " which shows sinus rhythm with acceptable QTc.  Labs reviewed and acceptable.  We reviewed the importance of dofetilide compliance and to notify our office if he misses more than 2 doses.  Reviewed the importance of continued OAC due to elevated CHADS2 score to mitigate stroke risk.  Continue dofetilide for rhythm control.  We also continue low-dose metoprolol.  We will follow-up in 6 months.  Will ask patient to repeat labs in December 2 specifically assess potassium and magnesium.    Plan  Continue dofetilide 500 mcg every 12 hours  Continue Eliquis  Continue low-dose metoprolol  Labs ordered  Follow-up in 6 months with repeat ECG and labs      Exclusive of any other services or procedures performed, Teresa SOLIMAN, APRN-CNP , spent 30 minutes in duration for this visit today.  This time consisted of chart review, obtaining history, and/or performing the exam as documented above as well as documenting the clinical information for the encounter in the electronic record, discussing treatment options, plans, and/or goals with patient, family, and/or caregiver, refilling medications, updating the electronic record, ordering medicines, lab work, imaging, referrals, and/or procedures as documented above and communicating with other [facility] professionals. I have discussed the results of laboratory, radiology, and cardiology studies with the patient and their family/caregiver.

## 2024-11-14 ENCOUNTER — APPOINTMENT (OUTPATIENT)
Dept: CARDIOLOGY | Facility: CLINIC | Age: 74
End: 2024-11-14
Payer: COMMERCIAL

## 2024-11-14 VITALS
BODY MASS INDEX: 37.62 KG/M2 | WEIGHT: 254 LBS | HEART RATE: 78 BPM | DIASTOLIC BLOOD PRESSURE: 73 MMHG | SYSTOLIC BLOOD PRESSURE: 114 MMHG | HEIGHT: 69 IN

## 2024-11-14 DIAGNOSIS — Z79.899 VISIT FOR MONITORING TIKOSYN THERAPY: Primary | ICD-10-CM

## 2024-11-14 DIAGNOSIS — Z51.81 VISIT FOR MONITORING TIKOSYN THERAPY: Primary | ICD-10-CM

## 2024-11-14 DIAGNOSIS — I48.19 PERSISTENT ATRIAL FIBRILLATION (MULTI): ICD-10-CM

## 2024-11-14 DIAGNOSIS — Z79.01 CURRENT USE OF LONG TERM ANTICOAGULATION: ICD-10-CM

## 2024-11-14 PROCEDURE — 99215 OFFICE O/P EST HI 40 MIN: CPT | Performed by: NURSE PRACTITIONER

## 2024-11-14 PROCEDURE — 3074F SYST BP LT 130 MM HG: CPT | Performed by: NURSE PRACTITIONER

## 2024-11-14 PROCEDURE — 3048F LDL-C <100 MG/DL: CPT | Performed by: NURSE PRACTITIONER

## 2024-11-14 PROCEDURE — 3008F BODY MASS INDEX DOCD: CPT | Performed by: NURSE PRACTITIONER

## 2024-11-14 PROCEDURE — 1159F MED LIST DOCD IN RCRD: CPT | Performed by: NURSE PRACTITIONER

## 2024-11-14 PROCEDURE — 1036F TOBACCO NON-USER: CPT | Performed by: NURSE PRACTITIONER

## 2024-11-14 PROCEDURE — 3078F DIAST BP <80 MM HG: CPT | Performed by: NURSE PRACTITIONER

## 2024-11-14 RX ORDER — DOFETILIDE 0.5 MG/1
500 CAPSULE ORAL EVERY 12 HOURS SCHEDULED
Qty: 180 CAPSULE | Refills: 1 | Status: SHIPPED | OUTPATIENT
Start: 2024-11-14 | End: 2025-05-13

## 2024-12-26 PROBLEM — I25.10 MILD CAD: Status: ACTIVE | Noted: 2024-12-26

## 2024-12-26 PROBLEM — I50.32 HEART FAILURE WITH IMPROVED EJECTION FRACTION (HFIMPEF): Status: ACTIVE | Noted: 2024-12-26

## 2024-12-26 ASSESSMENT — ENCOUNTER SYMPTOMS
NEAR-SYNCOPE: 0
FEVER: 0
ALTERED MENTAL STATUS: 0
HEMATURIA: 0
SYNCOPE: 0
SHORTNESS OF BREATH: 0
COUGH: 0
DYSPNEA ON EXERTION: 0
WHEEZING: 0
IRREGULAR HEARTBEAT: 0
NAUSEA: 0
PALPITATIONS: 0
VOMITING: 0
ORTHOPNEA: 0
CHILLS: 0
HEMATOCHEZIA: 0

## 2024-12-26 NOTE — PROGRESS NOTES
Chief Complaint/Reason for Visit:  Follow-up (3 mo f/u) 3 month cardiovascular follow up    History Of Present Illness:    Jatin Rodgers is a 74 y.o. male that presents to the office for 3 month follow up.    Taking medications as prescribed.     PMH is significant for chronic combined systolic and diastolic heart failure, atrial fibrillation s/p ablation Jan 2024 and DCC 04/01/2024, pulmonary HTN, DM type 2, umbilical hernia and JORDEN.     Past Medical History:  He has no past medical history on file.    Past Surgical History:  He has a past surgical history that includes IR biliary cholangiogram (7/3/2023) and Cardiac electrophysiology procedure (N/A, 1/16/2024).      Social History:  He reports that he has never smoked. He has never used smokeless tobacco. He reports that he does not drink alcohol and does not use drugs.    Family History:  Family History   Problem Relation Name Age of Onset    No Known Problems Mother      Other (heart valve replaced) Father          Allergies:  Patient has no known allergies.    Review of Systems   Constitutional: Negative for chills and fever.        Does not sleep well at night, but admits to naps throughout the day   Cardiovascular:  Positive for leg swelling. Negative for chest pain, dyspnea on exertion, irregular heartbeat, near-syncope, orthopnea, palpitations and syncope.   Respiratory:  Negative for cough, shortness of breath and wheezing.    Gastrointestinal:  Negative for hematochezia, melena, nausea and vomiting.   Genitourinary:  Negative for hematuria.   Psychiatric/Behavioral:  Negative for altered mental status.        Objective      Vitals reviewed.   Constitutional:       Appearance: Not in distress. Chronically ill-appearing.   Pulmonary:      Effort: Pulmonary effort is normal.      Breath sounds: Normal breath sounds.   Cardiovascular:      PMI at left midclavicular line. Normal rate. Regular rhythm. S1 with normal intensity. S2 with normal intensity.        Murmurs: There is no murmur.   Edema:     Peripheral edema present.     Pretibial: bilateral trace pitting edema of the pretibial area.     Ankle: bilateral trace pitting edema of the ankle.     Feet: bilateral trace pitting edema of the feet.  Abdominal:      General: Bowel sounds are normal.   Skin:     General: Skin is warm and dry.   Psychiatric:         Attention and Perception: Attention normal.         Mood and Affect: Mood normal.         Behavior: Behavior is cooperative.         Current Outpatient Medications   Medication Instructions    aspirin 81 mg, Daily    dofetilide (TIKOSYN) 500 mcg, oral, Every 12 hours scheduled    Eliquis 5 mg, oral, 2 times daily, PLEASE RESUME TONIGHT AS SCHEDULED 1/17/24    empagliflozin (JARDIANCE) 10 mg, oral, Daily    furosemide (LASIX) 40 mg, oral, Daily RT    lancets (OneTouch Delica Plus Lancet) 30 gauge misc No dose, route, or frequency recorded.    metFORMIN (GLUCOPHAGE) 500 mg, 2 times daily    metoprolol succinate XL (TOPROL XL) 25 mg, oral, Daily, Do not crush or chew.    nitroglycerin (NITROSTAT) 0.4 mg, Every 5 min PRN    OneTouch Verio test strips strip 2 times daily    potassium chloride CR (Klor-Con M20) 20 mEq ER tablet 40 mEq, oral, Daily, Do not crush, chew, or split.    sacubitriL-valsartan (Entresto) 49-51 mg tablet 1 tablet, oral, 2 times daily        Reviewed the following Labs:  CBC -  Lab Results   Component Value Date    WBC 8.4 09/16/2024    HGB 16.7 09/16/2024    HCT 53.3 (H) 09/16/2024    MCV 91 09/16/2024     09/16/2024       RENAL FUNCTION PANEL -   Lab Results   Component Value Date    GLUCOSE 90 10/10/2024     10/10/2024    K 4.2 10/10/2024     10/10/2024    CO2 30 10/10/2024    ANIONGAP 13 10/10/2024    BUN 11 10/10/2024    CREATININE 0.81 10/10/2024    GFRMALE >90 08/01/2023    CALCIUM 8.9 10/10/2024    PHOS 3.9 05/27/2023    ALBUMIN 4.2 09/16/2024        CMP -  Lab Results   Component Value Date    CALCIUM 8.9 10/10/2024  "   PHOS 3.9 05/27/2023    PROT 6.5 09/16/2024    ALBUMIN 4.2 09/16/2024    AST 16 09/16/2024    ALT 15 09/16/2024    ALKPHOS 62 09/16/2024    BILITOT 0.8 09/16/2024       LIPID PANEL -   Lab Results   Component Value Date    CHOL 123 09/16/2024    TRIG 113 09/16/2024    HDL 56.3 09/16/2024    CHHDL 2.2 09/16/2024    LDLF 66 05/27/2023    VLDL 23 09/16/2024    NHDL 67 09/16/2024     Lab Results   Component Value Date    LDLCALC 44 09/16/2024       Lab Results   Component Value Date    HGBA1C 6.5 (A) 11/06/2024       Lab Results   Component Value Date    TSH 1.32 05/27/2023       No results found for this or any previous visit.     Reviewed the following Cardiology Tests:    External cardioversion 10/2/24  Patient successfully cardioverted from Atrial fibrillation to Sinus rhythm    ZHANNA 4/1/24:   1. Left ventricular systolic function is normal.   2. Successful direct current cardioversion for atrial fibrillation resulting in sinus bradycardia.   3. The left atrium is moderately dilated.    TTE-limited 1/13/21  LV apical thrombus has resolved.  LVEF 35%    TTE 12/2/20  LVEF mildly dilated.  Global LV systolic function is severely reduced.  LVEF estimated 60%.  RV systolic function is mild to moderately reduced.  Mild mitral regurgitation.  Moderate tricuspid regurgitation.  RVSP estimated at 52 mmHg-consistent with moderate pulmonary hypertension.  There is a 1.8 cm spherical echogenic mass at the LV apex.  Left atrium is moderately dilated.  Right atrium is moderately dilated.  Mild aortic root dilatation.    [facility] 5/30/23:   1. Non-obstructive coronary artery disease.     Visit Vitals  /82 (BP Location: Right arm)   Pulse 59   Ht 1.753 m (5' 9\")   Wt 117 kg (259 lb)   BMI 38.25 kg/m²   Smoking Status Never   BSA 2.39 m²       Assessment/Plan   The primary encounter diagnosis was Paroxysmal atrial fibrillation (Multi). Diagnoses of Heart failure with improved ejection fraction (HFimpEF), Mild CAD, and Atrial " fibrillation, unspecified type (Multi) were also pertinent to this visit.    1. Paroxysmal atrial fibrillation s/p ablation Jan 2024  Recurrent AF s/p ablation and he was initiated on dofetilide per EP Oct 2024  Hypertension - Yes, 1 point, Congestive Heart Failure  or Left ventricular Dysfunction - Yes, 1 point, and Age 65-74 years - 1 point   [id number] score is 3.   Continue apixaban 5 mg BID    Continue Metoprolol succinate 25 mg daily   Continue dofetilide 500 mcg every 12 hours  Follows with EP - last office visit Nov 2024 and plan was to continue dofetilide 500 mcg q12 hours, apixaban and metoprolol succinate with f/u in 6 months    2. Chronic HFrEF/HFimpEF  Not volume overloaded on exam  Continue current medications which include:  Beta blocker: Metoprolol succinate 25 mg daily  ACE inhibitor/ARB/ARNI: Sacubitril-valsartan 49-51 mg BID  MRA: None  SGLT2i:  empagliflozin 10 mg daily  Diuretic: Furosemide 40 mg daily  Hydralazine/Nitrate: None  Device: None  TTE Dec 2020 with LVEF 16%  ZHANNA April 2024 with normal LVEF   Check repeat BMP/magnesium April 2025 as ordered by EP    3. LV thrombus  Noted in Dec 2020 with LVEF was 16%  Limited TTE Jan 2021 with resolution LV thrombus  No mention of LV thrombus on ZHANNA April 2024  On apixaban for h/o pAF    4. Mild CAD  [facility] May 2023 - Circumflex 30% stenosis proximal third of the mid.  Mild nonobstructive CAD  Continue metoprolol succinate 25 mg daily.    5. JORDEN, +daytime drowsiness  Previously, referred to sleep medicine by Dr. Ran Coker   New referral ordered for sleep medicine today    Mary Hess, APRN-CNP

## 2024-12-26 NOTE — PATIENT INSTRUCTIONS
Recommend Mediterranean style of eating  Continue current medications  Recommend evaluation by sleep medicine for sleep apnea  Check repeat lab work March/April 2025 as ordered by KAREN Moore  Follow-up with Dr. Coker in 6 months  If you have any questions or cardiac concerns, please call our office at [phone].

## 2024-12-30 ENCOUNTER — OFFICE VISIT (OUTPATIENT)
Dept: CARDIOLOGY | Facility: HOSPITAL | Age: 74
End: 2024-12-30
Payer: COMMERCIAL

## 2024-12-30 VITALS
DIASTOLIC BLOOD PRESSURE: 82 MMHG | HEIGHT: 69 IN | BODY MASS INDEX: 38.36 KG/M2 | SYSTOLIC BLOOD PRESSURE: 110 MMHG | WEIGHT: 259 LBS | HEART RATE: 59 BPM

## 2024-12-30 DIAGNOSIS — G47.33 OSA (OBSTRUCTIVE SLEEP APNEA): ICD-10-CM

## 2024-12-30 DIAGNOSIS — I48.0 PAROXYSMAL ATRIAL FIBRILLATION (MULTI): Primary | ICD-10-CM

## 2024-12-30 DIAGNOSIS — I48.91 ATRIAL FIBRILLATION, UNSPECIFIED TYPE (MULTI): ICD-10-CM

## 2024-12-30 DIAGNOSIS — I50.32 HEART FAILURE WITH IMPROVED EJECTION FRACTION (HFIMPEF): ICD-10-CM

## 2024-12-30 DIAGNOSIS — I25.10 MILD CAD: ICD-10-CM

## 2024-12-30 PROCEDURE — 99214 OFFICE O/P EST MOD 30 MIN: CPT | Performed by: NURSE PRACTITIONER

## 2024-12-30 PROCEDURE — 3079F DIAST BP 80-89 MM HG: CPT | Performed by: NURSE PRACTITIONER

## 2024-12-30 PROCEDURE — 1159F MED LIST DOCD IN RCRD: CPT | Performed by: NURSE PRACTITIONER

## 2024-12-30 PROCEDURE — 3074F SYST BP LT 130 MM HG: CPT | Performed by: NURSE PRACTITIONER

## 2024-12-30 PROCEDURE — 1160F RVW MEDS BY RX/DR IN RCRD: CPT | Performed by: NURSE PRACTITIONER

## 2024-12-30 PROCEDURE — 3048F LDL-C <100 MG/DL: CPT | Performed by: NURSE PRACTITIONER

## 2024-12-30 PROCEDURE — 1036F TOBACCO NON-USER: CPT | Performed by: NURSE PRACTITIONER

## 2024-12-30 PROCEDURE — 3008F BODY MASS INDEX DOCD: CPT | Performed by: NURSE PRACTITIONER

## 2024-12-30 ASSESSMENT — ENCOUNTER SYMPTOMS
DEPRESSION: 0
LOSS OF SENSATION IN FEET: 0
OCCASIONAL FEELINGS OF UNSTEADINESS: 0

## 2025-03-21 DIAGNOSIS — I48.91 ATRIAL FIBRILLATION, UNSPECIFIED TYPE (MULTI): ICD-10-CM

## 2025-03-26 ENCOUNTER — TELEPHONE (OUTPATIENT)
Dept: CARDIOLOGY | Facility: HOSPITAL | Age: 75
End: 2025-03-26
Payer: COMMERCIAL

## 2025-03-26 RX ORDER — POTASSIUM CHLORIDE 1500 MG/1
TABLET, EXTENDED RELEASE ORAL
Qty: 180 TABLET | Refills: 0 | Status: SHIPPED | OUTPATIENT
Start: 2025-03-26

## 2025-03-26 NOTE — TELEPHONE ENCOUNTER
Called patient to advise that he needs to have his labs ordered by Teresa done as soon as possible. Left vm asking for the patient to call back.

## 2025-05-06 LAB
ANION GAP SERPL CALCULATED.4IONS-SCNC: 9 MMOL/L (CALC) (ref 7–17)
BUN SERPL-MCNC: 10 MG/DL (ref 7–25)
BUN/CREAT SERPL: NORMAL (CALC) (ref 6–22)
CALCIUM SERPL-MCNC: 9.2 MG/DL (ref 8.6–10.3)
CHLORIDE SERPL-SCNC: 104 MMOL/L (ref 98–110)
CO2 SERPL-SCNC: 27 MMOL/L (ref 20–32)
CREAT SERPL-MCNC: 0.87 MG/DL (ref 0.7–1.28)
EGFRCR SERPLBLD CKD-EPI 2021: 91 ML/MIN/1.73M2
GLUCOSE SERPL-MCNC: 83 MG/DL (ref 65–99)
MAGNESIUM SERPL-MCNC: 2.3 MG/DL (ref 1.5–2.5)
POTASSIUM SERPL-SCNC: 3.8 MMOL/L (ref 3.5–5.3)
SODIUM SERPL-SCNC: 140 MMOL/L (ref 135–146)

## 2025-05-07 NOTE — PROGRESS NOTES
Electrophysiology Follow up Visit    History of Present Illness:  Jatin Rodgers is a 75 y.o. year old male patient with history of atrial fibrillation, combined diastolic systolic heart failure, pulmonary hypertension, diabetes, and JORDEN.    Patient was referred to electrophysiology for management of atrial fibrillation.  He has a history of atrial fibrillation with RVR in December 2020.  At that time LV function was 16% with LV thrombus.  He was started on OAC.  He was eventually cardioverted and started on amiodarone. At some point, patient was found to be in AF and amiodarone was discontinued and he was changed to rate control strategy. At the end of May 2023, patient presented to [facility] with diaphoresis and chest discomfort. He was transferred to [facility] and was found to be in AF RVR with hypotension. He had left heart catheterization which showed nonobstructive CAD. Echocardiogram showed LV function 45 to 50%. During hospitalization patient was found to have cholecystitis and had drain placed. He was rate controlled with metoprolol and digoxin. He had successful cholecystectomy.      Patient was seen by Dr. Sauer in October 2023 and patient was agreeable to ablation.  1/16/2024 successful RFA pulmonary vein isolation.  He had a reoccurrence in March 2024 with successful cardioversion in April 2024.  He did have another recurrence of atrial fibrillation and was admitted in October 2024 for dofetilide initiation with successful cardioversion.    Patient for follow-up for atrial fibrillation and dofetilide monitoring.  Patient is feeling well and has increased his activity.  He denies shortness of breath, palpitations, or syncope. He is compliant with daily medications and denies any bleeding concerns on OAC.     Medical History:  He has no past medical history on file.    Surgical History:  He has a past surgical history that includes IR biliary cholangiogram (7/3/2023) and Cardiac electrophysiology procedure (N/A,  1/16/2024).     Social History:  He reports that he has never smoked. He has never used smokeless tobacco. He reports that he does not drink alcohol and does not use drugs.       Family History[1]     ROS:  A 14 point review of systems was done and is negative other than as stated in HPI    Physical Exam  Constitutional:       Appearance: Normal appearance.   Eyes:      Pupils: Pupils are equal, round, and reactive to light.   Cardiovascular:      Rate and Rhythm: Normal rate and regular rhythm.      Heart sounds: Normal heart sounds.   Pulmonary:      Effort: Pulmonary effort is normal.      Breath sounds: Normal breath sounds.   Musculoskeletal:         General: Normal range of motion.   Skin:     General: Skin is warm and dry.   Neurological:      Mental Status: He is alert and oriented to person, place, and time.       Allergies:  Patient has no known allergies.    Outpatient Medications:  Current Outpatient Medications   Medication Instructions    0.9 % sodium chloride (sodium chloride, PF, 0.9%) injection Infuse into a venous catheter.    aspirin 81 mg, Daily    dofetilide (TIKOSYN) 500 mcg, oral, Every 12 hours scheduled    Eliquis 5 mg, oral, 2 times daily, PLEASE RESUME TONIGHT AS SCHEDULED 1/17/24    empagliflozin (JARDIANCE) 10 mg, oral, Daily    furosemide (LASIX) 40 mg, oral, Daily RT    lancets (OneTouch Delica Plus Lancet) 30 gauge misc No dose, route, or frequency recorded.    metFORMIN (GLUCOPHAGE) 500 mg, 2 times daily    metoprolol succinate XL (TOPROL XL) 25 mg, oral, Daily, Do not crush or chew.    nitroglycerin (NITROSTAT) 0.4 mg, Every 5 min PRN    OneTouch Verio test strips strip 2 times daily    potassium chloride CR (Klor-Con M20) 20 mEq ER tablet TAKE 2 TABLETS (40 MEQ) BY MOUTH ONCE DAILY. DO NOT CRUSH, CHEW, OR SPLIT.    sacubitriL-valsartan (Entresto) 49-51 mg tablet 1 tablet, oral, 2 times daily      Reviewed Labs:  CBC  Lab Results   Component Value Date    WBC 8.4 09/16/2024    HGB  "16.7 09/16/2024    HCT 53.3 (H) 09/16/2024    MCV 91 09/16/2024     09/16/2024        Renal Function Panel  Lab Results   Component Value Date    GLUCOSE 83 05/06/2025     05/06/2025    K 3.8 05/06/2025     05/06/2025    CO2 27 05/06/2025    ANIONGAP 9 05/06/2025    BUN 10 05/06/2025    CREATININE 0.87 05/06/2025    GFRMALE >90 08/01/2023    CALCIUM 9.2 05/06/2025        CMP  Lab Results   Component Value Date    CALCIUM 9.2 05/06/2025    PHOS 3.9 05/27/2023    PROT 6.5 09/16/2024    ALBUMIN 4.2 09/16/2024    AST 16 09/16/2024    ALT 15 09/16/2024    ALKPHOS 62 09/16/2024    BILITOT 0.8 09/16/2024        Mg   Lab Results   Component Value Date    MG 2.3 05/06/2025        Thyroid  Lab Results   Component Value Date    TSH 1.32 05/27/2023        Visit Vitals  /70 (BP Location: Left arm)   Pulse 63   Ht 1.753 m (5' 9\")   Wt 121 kg (266 lb)   BMI 39.28 kg/m²   Smoking Status Never   BSA 2.43 m²      Cardiac Testing Reviewed Study(s):  ZHANNA/DCCV (April/2024):  CONCLUSIONS:   1. Left ventricular systolic function is normal.   2. Successful direct current cardioversion for atrial fibrillation resulting in sinus bradycardia.   3. The left atrium is moderately dilated.  Echo (May/2023):   CONCLUSIONS:  1. Left ventricular systolic function is low normal with a 45-50% estimated ejection fraction.  2. There is reduced right ventricular systolic function.  3. The patient is in atrial fibrillation which may influence the estimate of left ventricular function and transvalvular flows.  Cath (May/2023):   CONCLUSIONS:  1. Non-obstructive coronary artery disease.  ECGs (reviewed and my interpretation):   3/1/2024 atrial fibrillation with rate of 81 bpm  11/14/2024 sinus rhythm with rate of 78 bpm with  ms  5/8/2025 sinus rhythm with rate of 63 bpm with QTC 425ms    Assessment  Atrial fibrillation:  Patient admitted in December 2020 with atrial fibrillation with RVR.  LV function was 16% at that time " with LV thrombus.  Started on OAC  Eventually had cardioversion and started on amiodarone  At some point he had a reoccurrence of atrial fibrillation and amiodarone was discontinued and he was changed to rate control strategy  May 2023 patient admitted with AF RVR.  LHC showed nonobstructive CAD.  Echo showed LVEF of 45-50%.  He was rate controlled and discharged home  Established with electrophysiology in October 2023 1/16/2024 successful RFA pulmonary vein isolation per Dr. Sauer  Recurrent atrial fibrillation with cardioversions and admitted in October 2024 for dofetilide initiation    ECG today shows sinus rhythm with acceptable QTC.  Labs reviewed from earlier this week.  Patient is maintaining sinus rhythm on dofetilide. We reviewed lifestyle modifications to reduce risk of recurrence of atrial fibrillation.  Patient has increased his activity with walking.  Currently not using CPAP.  Encouraged patient to follow-up with sleep medicine.  Patient is unsure if he is taking potassium supplement.  Will reach out to his daughter to confirm his medications as potassium needs supplement.  Reviewed the importance of OAC due to elevated [id number] score.  Will continue dofetilide and Eliquis.  Reviewed the importance of dofetilide compliance every 12 hours and to notify office if he would miss more than 2 doses.  Will follow-up in 6 months.    Combined systolic/diastolic heart failure:  Appears euvolemic  Compliant with daily medications  Managed by general cardiology    JORDEN:  Not currently using CPAP  Encouraged patient to follow up with sleep medicine      Plan  Continue dofetilide 500 mcg every 12 hours  Continue Eliquis twice daily  Will confirm medications including potassium supplement  Will repeat labs in 1 month  Follow-up in 6 months      Exclusive of any other services or procedures performed, Teresa SOLIMAN APRN-CNP , spent 30 minutes in duration for this visit today.  This time consisted of  chart review, obtaining history, and/or performing the exam as documented above as well as documenting the clinical information for the encounter in the electronic record, discussing treatment options, plans, and/or goals with patient, family, and/or caregiver, refilling medications, updating the electronic record, ordering medicines, lab work, imaging, referrals, and/or procedures as documented above and communicating with other [facility] professionals. I have discussed the results of laboratory, radiology, and cardiology studies with the patient and their family/caregiver.          [1]   Family History  Problem Relation Name Age of Onset    No Known Problems Mother      Other (heart valve replaced) Father

## 2025-05-08 ENCOUNTER — APPOINTMENT (OUTPATIENT)
Dept: CARDIOLOGY | Facility: CLINIC | Age: 75
End: 2025-05-08
Payer: COMMERCIAL

## 2025-05-08 VITALS
DIASTOLIC BLOOD PRESSURE: 70 MMHG | WEIGHT: 266 LBS | HEART RATE: 63 BPM | HEIGHT: 69 IN | BODY MASS INDEX: 39.4 KG/M2 | SYSTOLIC BLOOD PRESSURE: 124 MMHG

## 2025-05-08 DIAGNOSIS — I48.0 PAROXYSMAL ATRIAL FIBRILLATION (MULTI): ICD-10-CM

## 2025-05-08 DIAGNOSIS — Z79.01 CURRENT USE OF LONG TERM ANTICOAGULATION: ICD-10-CM

## 2025-05-08 DIAGNOSIS — I48.91 ATRIAL FIBRILLATION, UNSPECIFIED TYPE (MULTI): ICD-10-CM

## 2025-05-08 DIAGNOSIS — I50.42 CHRONIC COMBINED SYSTOLIC AND DIASTOLIC HEART FAILURE: ICD-10-CM

## 2025-05-08 DIAGNOSIS — I48.19 PERSISTENT ATRIAL FIBRILLATION (MULTI): ICD-10-CM

## 2025-05-08 DIAGNOSIS — Z51.81 VISIT FOR MONITORING TIKOSYN THERAPY: Primary | ICD-10-CM

## 2025-05-08 DIAGNOSIS — Z79.899 VISIT FOR MONITORING TIKOSYN THERAPY: Primary | ICD-10-CM

## 2025-05-08 RX ORDER — METOPROLOL SUCCINATE 25 MG/1
25 TABLET, EXTENDED RELEASE ORAL DAILY
Qty: 90 TABLET | Refills: 3 | Status: SHIPPED | OUTPATIENT
Start: 2025-05-08 | End: 2026-05-08

## 2025-05-08 RX ORDER — SODIUM CHLORIDE 9 MG/ML
INJECTION, SOLUTION INTRAMUSCULAR; INTRAVENOUS; SUBCUTANEOUS
COMMUNITY
Start: 2023-06-20

## 2025-05-08 RX ORDER — DOFETILIDE 0.5 MG/1
500 CAPSULE ORAL EVERY 12 HOURS SCHEDULED
Qty: 180 CAPSULE | Refills: 1 | Status: SHIPPED | OUTPATIENT
Start: 2025-05-08 | End: 2025-11-04

## 2025-05-08 NOTE — TELEPHONE ENCOUNTER
----- Message from Nurse Nurys PLATT sent at 5/8/2025  3:37 PM EDT -----  Regarding: Refill  Patient's daughter requesting refills of all medications that were prescribed by Dr. Coker.

## 2025-05-09 RX ORDER — FUROSEMIDE 40 MG/1
40 TABLET ORAL
Qty: 90 TABLET | Refills: 3 | Status: SHIPPED | OUTPATIENT
Start: 2025-05-09 | End: 2026-05-09

## 2025-05-09 RX ORDER — APIXABAN 5 MG/1
5 TABLET, FILM COATED ORAL 2 TIMES DAILY
Qty: 180 TABLET | Refills: 3 | Status: SHIPPED | OUTPATIENT
Start: 2025-05-09 | End: 2026-05-09

## 2025-06-08 DIAGNOSIS — Z51.81 VISIT FOR MONITORING TIKOSYN THERAPY: ICD-10-CM

## 2025-06-08 DIAGNOSIS — Z79.899 VISIT FOR MONITORING TIKOSYN THERAPY: ICD-10-CM

## 2025-06-08 DIAGNOSIS — I48.19 PERSISTENT ATRIAL FIBRILLATION (MULTI): ICD-10-CM

## 2025-06-18 DIAGNOSIS — I48.0 PAROXYSMAL ATRIAL FIBRILLATION (MULTI): ICD-10-CM

## 2025-06-18 DIAGNOSIS — I48.91 ATRIAL FIBRILLATION, UNSPECIFIED TYPE (MULTI): ICD-10-CM

## 2025-06-29 NOTE — PROGRESS NOTES
Counseling:  The patient was counseled regarding diagnostic results, instructions for management, risk factor reductions, prognosis, patient and family education, impressions, risks and benefits of treatment options and importance of compliance with treatment.      Chief Complaint:  The patient presents today for 6-month followup of CAD and a-fib. Over the past 6 months, the patient states that he has done well from a cardiac standpoint. He denies any CP, chest discomfort or SOB. BP has been stable at home. The patient is compliant with his prescribed medications.       History Of Present Illness:    Jatin Rodgers is a 75 y.o. male patient who presents today for 6-month followup of CAD and a-fib. His PMH is significant for chronic combined systolic and diastolic heart failure, atrial fibrillation s/p DCC 04/01/2024, pulmonary HTN, DM type 2, umbilication hernia and JORDEN.    Past Surgical History:  He has a past surgical history that includes IR biliary cholangiogram (7/3/2023) and Cardiac electrophysiology procedure (N/A, 1/16/2024).      Social History:  He reports that he has never smoked. He has never used smokeless tobacco. He reports that he does not drink alcohol and does not use drugs.    Family History:  Family History   Problem Relation Name Age of Onset    No Known Problems Mother      Other (heart valve replaced) Father          Allergies:  Patient has no known allergies.    Outpatient Medications:  Current Outpatient Medications   Medication Instructions    0.9 % sodium chloride (sodium chloride, PF, 0.9%) injection Infuse into a venous catheter.    aspirin 81 mg, Daily    dofetilide (TIKOSYN) 500 mcg, oral, Every 12 hours scheduled    Eliquis 5 mg, oral, 2 times daily, PLEASE RESUME TONIGHT AS SCHEDULED 1/17/24    empagliflozin (JARDIANCE) 10 mg, oral, Daily    furosemide (LASIX) 40 mg, oral, Daily RT    lancets (OneTouch Delica Plus Lancet) 30 gauge misc No dose, route, or frequency recorded.    metFORMIN  (GLUCOPHAGE) 500 mg, 2 times daily    metoprolol succinate XL (TOPROL XL) 25 mg, oral, Daily, Do not crush or chew.    nitroglycerin (NITROSTAT) 0.4 mg, Every 5 min PRN    OneTouch Verio test strips strip 2 times daily    potassium chloride CR (Klor-Con M20) 20 mEq ER tablet TAKE 2 TABLETS (40 MEQ) BY MOUTH ONCE DAILY. DO NOT CRUSH, CHEW, OR SPLIT.    sacubitriL-valsartan (Entresto) 49-51 mg tablet 1 tablet, oral, 2 times daily        Last Recorded Vitals:  There were no vitals filed for this visit.        Review of Systems   All other systems reviewed and are negative.     Physical Exam:  Constitutional:       Appearance: Healthy appearance. Not in distress.   Neck:      Vascular: No JVR. JVD normal.   Pulmonary:      Effort: Pulmonary effort is normal.      Breath sounds: Normal breath sounds. No wheezing. No rhonchi. No rales.   Chest:      Chest wall: Not tender to palpatation.   Cardiovascular:      PMI at left midclavicular line. Normal rate. Regular rhythm. Normal S1. Normal S2.       Murmurs: There is no murmur.      No gallop.  No click. No rub.   Pulses:     Intact distal pulses.   Edema:     Peripheral edema absent.   Abdominal:      General: Bowel sounds are normal.      Palpations: Abdomen is soft.      Tenderness: There is no abdominal tenderness.   Musculoskeletal: Normal range of motion.         General: No tenderness. Skin:     General: Skin is warm and dry.   Neurological:      General: No focal deficit present.      Mental Status: Alert and oriented to person, place and time.       Last Labs:  CBC -  Lab Results   Component Value Date    WBC 8.4 09/16/2024    HGB 16.7 09/16/2024    HCT 53.3 (H) 09/16/2024    MCV 91 09/16/2024     09/16/2024       CMP -  Lab Results   Component Value Date    CALCIUM 9.2 05/06/2025    PHOS 3.9 05/27/2023    PROT 6.5 09/16/2024    ALBUMIN 4.2 09/16/2024    AST 16 09/16/2024    ALT 15 09/16/2024    ALKPHOS 62 09/16/2024    BILITOT 0.8 09/16/2024       LIPID  PANEL -   Lab Results   Component Value Date    CHOL 123 09/16/2024    TRIG 113 09/16/2024    HDL 56.3 09/16/2024    CHHDL 2.2 09/16/2024    LDLF 66 05/27/2023    VLDL 23 09/16/2024    NHDL 67 09/16/2024       RENAL FUNCTION PANEL -   Lab Results   Component Value Date    GLUCOSE 83 05/06/2025     05/06/2025    K 3.8 05/06/2025     05/06/2025    CO2 27 05/06/2025    ANIONGAP 9 05/06/2025    BUN 10 05/06/2025    CREATININE 0.87 05/06/2025    GFRMALE >90 08/01/2023    CALCIUM 9.2 05/06/2025    PHOS 3.9 05/27/2023    ALBUMIN 4.2 09/16/2024        Lab Results   Component Value Date    HGBA1C 6.5 (A) 11/06/2024       Last Cardiology Tests:  04/01/2024 - ZHANNA Cardioversion  1. Left ventricular systolic function is normal.  2. Successful direct current cardioversion for atrial fibrillation resulting in sinus bradycardia.  3. The left atrium is moderately dilated.    01/19/2024 - Electrophysiology Procedure  Successful pulmonary vein isolation for atrial fibrillation     05/30/2023 - Cardiac Catheterization (LH)  Non-obstructive coronary artery disease.     05/27/2023 - TTE  1. Left ventricular systolic function is low normal with a 45-50% estimated ejection fraction.  2. There is reduced right ventricular systolic function.  3. The patient is in atrial fibrillation which may influence the estimate of left ventricular function and transvalvular flows.  4. The aortic root is abnormal. There is mild dilatation of the ascending aorta (4.0 cm)     12/02/2020 - TTE  1. The patient was in atrial fibrillation during the study.  2. The left ventricle is mildly dilated. Global left ventricular systolic function is severely reduced with an LVEF estimated at 16%. The segmented left ventricular dysfunction is as shown in the diagram.  3. The right ventricle is mildly dilated. Right ventricular systolic function is mild to moderately reduced.  4. There is mild mitral regurgitation.   5. There is moderate tricuspid  regurgitation.  6. RVSP estimated at 32+20= 52 mmHg; consistent with moderate pulmonary HTN.  7. There is a 1.8 cm spherical echogenic mass at the LV apex.  8. The left atrium is moderately dilated.  9. The right atrium is moderately dilated.  10. Mild aortic root dilatation.  11. The IVC is dilated and does not significantly change with respiration.     Lab review: I have personally reviewed the laboratory result(s).    Assessment/Plan   1) Atrial Fibrillation s/p PVI 01/16/2024, s/p DCC 04/01/2024  On Eliquis 5 mg BID, metoprolol succinate 100 mg daily, digoxin 125 mcg daily  Established with Dr. Sauer   S/P pulmonary vein isolation 01/16/2024  Patient subsequently reverted back to a-fib  Now s/p ZHANNA cardioversion 04/01/2024    2) CAD  On ASA 81 mg daily, Entresto 49-51 mg BID, furosemide 40 mg daily, Jardiance 10 mg daily, metoprolol succinate 100 mg daily.  Ranexa previously discontinued  TTE 05/27/2023 with LVEF 45-50%, reduced RV systolic function, in atrial fibrillation, mild dilatation of ascending aorta (4.0 cm)   LHC 05/30/2023 with non-obstructive CAD  ZHANNA 04/01/2024 with normal LV systolic function    3) JORDEN  Remote diagnosis of JORDEN, but treatment not initiated as he was having multiple other medical difficulties - per the patient's wife.  Referral previously placed to Dr. Khan, sleep medicine specialist       Scribe Attestation  By signing my name below, IKait Scribe attest that this documentation has been prepared under the direction and in the presence of Ran Coker MD. All medical record entries made by the Scribe were at my direction or personally dictated by me. I have reviewed the chart and agree that the record accurately reflects my personal performance of the history, physical exam, discussion and plan.

## 2025-06-30 ENCOUNTER — OFFICE VISIT (OUTPATIENT)
Dept: CARDIOLOGY | Facility: HOSPITAL | Age: 75
End: 2025-06-30
Payer: COMMERCIAL

## 2025-06-30 VITALS
HEIGHT: 69 IN | SYSTOLIC BLOOD PRESSURE: 112 MMHG | DIASTOLIC BLOOD PRESSURE: 72 MMHG | HEART RATE: 82 BPM | WEIGHT: 265 LBS | BODY MASS INDEX: 39.25 KG/M2

## 2025-06-30 DIAGNOSIS — I50.42 CHRONIC COMBINED SYSTOLIC AND DIASTOLIC HEART FAILURE: ICD-10-CM

## 2025-06-30 DIAGNOSIS — I48.0 PAROXYSMAL ATRIAL FIBRILLATION (MULTI): ICD-10-CM

## 2025-06-30 DIAGNOSIS — I50.32 HEART FAILURE WITH IMPROVED EJECTION FRACTION (HFIMPEF): ICD-10-CM

## 2025-06-30 DIAGNOSIS — I48.91 ATRIAL FIBRILLATION, UNSPECIFIED TYPE (MULTI): ICD-10-CM

## 2025-06-30 LAB
ATRIAL RATE: 82 BPM
P AXIS: 25 DEGREES
P OFFSET: 175 MS
P ONSET: 103 MS
PR INTERVAL: 212 MS
Q ONSET: 209 MS
QRS COUNT: 14 BEATS
QRS DURATION: 100 MS
QT INTERVAL: 380 MS
QTC CALCULATION(BAZETT): 443 MS
QTC FREDERICIA: 421 MS
R AXIS: -76 DEGREES
T AXIS: 80 DEGREES
T OFFSET: 399 MS
VENTRICULAR RATE: 82 BPM

## 2025-06-30 PROCEDURE — 93005 ELECTROCARDIOGRAM TRACING: CPT | Performed by: INTERNAL MEDICINE

## 2025-06-30 PROCEDURE — 1160F RVW MEDS BY RX/DR IN RCRD: CPT | Performed by: INTERNAL MEDICINE

## 2025-06-30 PROCEDURE — 99213 OFFICE O/P EST LOW 20 MIN: CPT | Performed by: INTERNAL MEDICINE

## 2025-06-30 PROCEDURE — 3078F DIAST BP <80 MM HG: CPT | Performed by: INTERNAL MEDICINE

## 2025-06-30 PROCEDURE — 1036F TOBACCO NON-USER: CPT | Performed by: INTERNAL MEDICINE

## 2025-06-30 PROCEDURE — 1159F MED LIST DOCD IN RCRD: CPT | Performed by: INTERNAL MEDICINE

## 2025-06-30 PROCEDURE — 99212 OFFICE O/P EST SF 10 MIN: CPT | Performed by: INTERNAL MEDICINE

## 2025-06-30 PROCEDURE — 3008F BODY MASS INDEX DOCD: CPT | Performed by: INTERNAL MEDICINE

## 2025-06-30 PROCEDURE — 3074F SYST BP LT 130 MM HG: CPT | Performed by: INTERNAL MEDICINE

## 2025-06-30 RX ORDER — APIXABAN 5 MG/1
5 TABLET, FILM COATED ORAL 2 TIMES DAILY
Qty: 180 TABLET | Refills: 3 | Status: SHIPPED | OUTPATIENT
Start: 2025-06-30 | End: 2026-06-30

## 2025-06-30 RX ORDER — POTASSIUM CHLORIDE 20 MEQ/1
20 TABLET, EXTENDED RELEASE ORAL DAILY
Qty: 180 TABLET | Refills: 0 | Status: SHIPPED | OUTPATIENT
Start: 2025-06-30

## 2025-06-30 RX ORDER — FUROSEMIDE 40 MG/1
40 TABLET ORAL
Qty: 90 TABLET | Refills: 3 | Status: SHIPPED | OUTPATIENT
Start: 2025-06-30 | End: 2026-06-30

## 2025-06-30 RX ORDER — METOPROLOL SUCCINATE 25 MG/1
25 TABLET, EXTENDED RELEASE ORAL DAILY
Qty: 90 TABLET | Refills: 3 | Status: SHIPPED | OUTPATIENT
Start: 2025-06-30 | End: 2026-06-30

## 2025-06-30 ASSESSMENT — ENCOUNTER SYMPTOMS
DEPRESSION: 0
OCCASIONAL FEELINGS OF UNSTEADINESS: 0
LOSS OF SENSATION IN FEET: 0

## 2025-06-30 NOTE — PATIENT INSTRUCTIONS
Continue all current medications as prescribed. Refills sent to the pharmacy.   Follow up with Mary Hess NP, in 6 months.    If you have any questions or cardiac concerns, please call our office at [phone].

## 2025-07-08 RX ORDER — FUROSEMIDE 40 MG/1
40 TABLET ORAL DAILY
Qty: 90 TABLET | Refills: 1 | OUTPATIENT
Start: 2025-07-08

## 2025-07-08 RX ORDER — APIXABAN 5 MG/1
5 TABLET, FILM COATED ORAL 2 TIMES DAILY
Qty: 180 TABLET | Refills: 3 | OUTPATIENT
Start: 2025-07-08 | End: 2026-07-08

## 2025-11-13 ENCOUNTER — APPOINTMENT (OUTPATIENT)
Dept: CARDIOLOGY | Facility: CLINIC | Age: 75
End: 2025-11-13
Payer: COMMERCIAL

## 2025-12-30 ENCOUNTER — APPOINTMENT (OUTPATIENT)
Dept: CARDIOLOGY | Facility: HOSPITAL | Age: 75
End: 2025-12-30
Payer: COMMERCIAL

## (undated) DEVICE — CATHETER, OCTARAY, OCTA,GALAXY,3-3-3-3-3,D-CURVE

## (undated) DEVICE — TUBING SET, IRRIGATION, SMARTABLATE

## (undated) DEVICE — INTRODUCER, CATHETER, HEMOSTASIS, FAST-CATH, 11 FR X 12 CM

## (undated) DEVICE — PATCHES, EXTERNAL REFERENCE, CARTO3

## (undated) DEVICE — SHEATH, STEERABLE, SUREFLEX, MEDIUM CURVE

## (undated) DEVICE — NEEDLE, NRG TRANSSEPTAL, 98CM, CURVE C0

## (undated) DEVICE — Device

## (undated) DEVICE — INTRODUCER, HEMOSTASIS, STR/J .038 IN, 8.5FR 12CM

## (undated) DEVICE — INTRODUCER, SHEATH, FAST-CATH, 8FR X 12CM, C-LOCK

## (undated) DEVICE — CATHETHER, CS, BI-DIRECTIONAL, 10 POLES, D-F TYPE

## (undated) DEVICE — CLOSURE SYSTEM, VASCULAR, MVP 6-12FR, VENOUS

## (undated) DEVICE — CATHETER, THERMOCOOL SMART TOUCH, SF, D-F CURVE

## (undated) DEVICE — INTRODUCER, HEMOSTASIS, STR/J .038 IN, 9FR 12CM